# Patient Record
Sex: FEMALE | Race: WHITE | NOT HISPANIC OR LATINO | Employment: OTHER | URBAN - NONMETROPOLITAN AREA
[De-identification: names, ages, dates, MRNs, and addresses within clinical notes are randomized per-mention and may not be internally consistent; named-entity substitution may affect disease eponyms.]

---

## 2021-08-30 ENCOUNTER — OFFICE VISIT (OUTPATIENT)
Dept: PULMONOLOGY | Facility: CLINIC | Age: 66
End: 2021-08-30

## 2021-08-30 VITALS
HEIGHT: 64 IN | RESPIRATION RATE: 16 BRPM | TEMPERATURE: 97.3 F | OXYGEN SATURATION: 100 % | DIASTOLIC BLOOD PRESSURE: 51 MMHG | WEIGHT: 86.9 LBS | BODY MASS INDEX: 14.84 KG/M2 | HEART RATE: 84 BPM | SYSTOLIC BLOOD PRESSURE: 116 MMHG

## 2021-08-30 DIAGNOSIS — J43.2 CENTRILOBULAR EMPHYSEMA (HCC): Primary | ICD-10-CM

## 2021-08-30 DIAGNOSIS — Z72.0 TOBACCO ABUSE: ICD-10-CM

## 2021-08-30 DIAGNOSIS — E44.0 MODERATE MALNUTRITION (HCC): ICD-10-CM

## 2021-08-30 PROCEDURE — 99204 OFFICE O/P NEW MOD 45 MIN: CPT | Performed by: INTERNAL MEDICINE

## 2021-08-30 PROCEDURE — 99406 BEHAV CHNG SMOKING 3-10 MIN: CPT | Performed by: INTERNAL MEDICINE

## 2021-08-30 RX ORDER — ATORVASTATIN CALCIUM 20 MG/1
20 TABLET, FILM COATED ORAL DAILY
COMMUNITY

## 2021-08-30 RX ORDER — OMEPRAZOLE 20 MG/1
20 CAPSULE, DELAYED RELEASE ORAL DAILY
COMMUNITY

## 2021-08-30 RX ORDER — HYDROCORTISONE AND ACETIC ACID 20.75; 10.375 MG/ML; MG/ML
SOLUTION AURICULAR (OTIC) 2 TIMES DAILY
COMMUNITY

## 2021-08-30 RX ORDER — TRIAMTERENE AND HYDROCHLOROTHIAZIDE 37.5; 25 MG/1; MG/1
1 TABLET ORAL DAILY
COMMUNITY
End: 2023-03-01

## 2021-08-30 RX ORDER — DICLOFENAC SODIUM AND MISOPROSTOL 75; 200 MG/1; UG/1
1 TABLET, DELAYED RELEASE ORAL 2 TIMES DAILY
COMMUNITY
End: 2023-02-13 | Stop reason: SDUPTHER

## 2021-08-30 RX ORDER — HYDROXYZINE HYDROCHLORIDE 25 MG/1
25 TABLET, FILM COATED ORAL NIGHTLY
COMMUNITY

## 2021-08-30 RX ORDER — POTASSIUM CHLORIDE 20MEQ/15ML
LIQUID (ML) ORAL DAILY
COMMUNITY
End: 2023-02-13 | Stop reason: SDUPTHER

## 2021-08-30 RX ORDER — ALBUTEROL SULFATE 90 UG/1
2 AEROSOL, METERED RESPIRATORY (INHALATION) EVERY 4 HOURS PRN
COMMUNITY
End: 2023-02-13 | Stop reason: SDUPTHER

## 2021-08-30 RX ORDER — ACETAMINOPHEN AND CODEINE PHOSPHATE 300; 30 MG/1; MG/1
1 TABLET ORAL EVERY 4 HOURS PRN
COMMUNITY

## 2021-08-30 NOTE — PROGRESS NOTES
Primary Care Provider  Olena Milner MD   Referring Provider  No ref. provider found      Patient Complaint  COPD (New Patient )      Subjective          Melodie Whittington presents to [facility] PULMONARY & CRITICAL CARE MEDICINE      History of Presenting Illness  Melodie Whittington is a 65 y.o. female with a history of COPD here to establish care.  She was previously seeing Dr. Barrera of [place] pul[facility] when he was coming to [place].  Since he is no longer coming here she would like to see a pulmonologist closer to home.  Her last PFT was done last year showing severe airflow obstruction.  She also had a chest CT done at [facility] last year showing partially collapsed lung due to mucous plugging.  She denies any history of pneumothorax.  She states that she has about 1 outpatient exacerbation here managed with steroids and antibiotics.  Otherwise, she is doing well.  She takes Breo daily and uses albuterol 1-2 times a day.  Her dyspnea is at baseline.  She gets short of breath walking about 1200 feet or up 2 flights of steps.  It is moderate severity, worse with exertion and relieved with rest.  She denies coughing, wheezing, headaches, chest pain, weight loss or hemoptysis. Denies fevers, chills and night sweats.  She is able to perform ADLs without difficulties and denies and swollen glands/lymph nodes in the head or neck.    Unfortunately, she still continues to smoke.  She smoked 1 pack of cigarettes a day for about 10 years, then quit for 10 years, and is now been smoking for the last 15 years.  She is try nicotine replaced therapy which has not helped.  She has no interest in trying pharmacotherapy.    I have personally reviewed the review of systems, past family, social, medical and surgical histories; and agree with their findings.        Review of Systems  Constitutional symptoms:  Denied complaints   Ear, nose, throat: Denied complaints  Cardiovascular:  Denied  complaints  Respiratory: Dyspnea, otherwise denied complaints  Gastrointestinal: Denied complaints  Musculoskeletal: Denied complaints  Genitourinary: Denied complaints  Allergy / Immunology: Denied complaints  Hematologic: Denied complaints  Neurologic: Denied complaints  Skin: Denied complaints  Endocrine: Denied complaints  Psychiatric: Denied complaints      Family History   Problem Relation Age of Onset   • Cancer Mother    • Cancer Brother    • COPD Brother         Social History     Socioeconomic History   • Marital status:      Spouse name: Not on file   • Number of children: Not on file   • Years of education: Not on file   • Highest education level: Not on file   Tobacco Use   • Smoking status: Current Every Day Smoker     Packs/day: 1.00   • Smokeless tobacco: Never Used   Vaping Use   • Vaping Use: Never used   Substance and Sexual Activity   • Alcohol use: Defer   • Drug use: Defer   • Sexual activity: Defer        History reviewed. No pertinent past medical history.     Immunization History   Administered Date(s) Administered   • COVID-19 (PFIZER) 04/29/2021, 05/20/2021   • Flu Vaccine Intradermal Quad 18-64YR 10/05/2015, 09/28/2016   • Flu Vaccine Quad PF 6-35MO 09/19/2017   • Fluzone High Dose =>65 Years (Vaxcare ONLY) 12/12/2013, 09/18/2014   • Fluzone High-Dose 65+yrs 09/15/2020   • Hepatitis A 05/20/2018, 11/29/2018   • Pneumococcal Conjugate 13-Valent (PCV13) 10/29/2015   • Pneumococcal Polysaccharide (PPSV23) 09/19/2017   • Td 10/13/2004   • Tdap 02/26/2019         No Known Allergies       Current Outpatient Medications:   •  acetaminophen-codeine (TYLENOL #3) 300-30 MG per tablet, Take 1 tablet by mouth Every 4 (Four) Hours As Needed for Moderate Pain ., Disp: , Rfl:   •  acetic acid-hydrocortisone (VOSOL-HC) 1-2 % otic solution, 2 (Two) Times a Day., Disp: , Rfl:   •  albuterol sulfate  (90 Base) MCG/ACT inhaler, Inhale 2 puffs Every 4 (Four) Hours As Needed for Wheezing., Disp:  ", Rfl:   •  atorvastatin (LIPITOR) 20 MG tablet, Take 20 mg by mouth Daily., Disp: , Rfl:   •  diclofenac-miSOPROStol (ARTHROTEC 75) 75-0.2 MG EC tablet, Take 1 tablet by mouth 2 (Two) Times a Day., Disp: , Rfl:   •  fluticasone (VERAMYST) 27.5 MCG/SPRAY nasal spray, 2 sprays into the nostril(s) as directed by provider Daily., Disp: , Rfl:   •  Fluticasone Furoate-Vilanterol (Breo Ellipta) 100-25 MCG/INH inhaler, Inhale 1 puff Daily., Disp: , Rfl:   •  hydrOXYzine (ATARAX) 25 MG tablet, Take 25 mg by mouth 3 (Three) Times a Day As Needed for Itching., Disp: , Rfl:   •  omeprazole (priLOSEC) 20 MG capsule, Take 20 mg by mouth Daily., Disp: , Rfl:   •  potassium chloride (KAYCIEL) 20 MEQ/15ML (10%) solution, Take  by mouth Daily., Disp: , Rfl:   •  triamterene-hydrochlorothiazide (MAXZIDE-25) 37.5-25 MG per tablet, Take 1 tablet by mouth Daily., Disp: , Rfl:          Objective     Vital Signs:   /51 (BP Location: Left arm, Patient Position: Sitting, Cuff Size: Small Adult)   Pulse 84   Temp 97.3 °F (36.3 °C) (Tympanic)   Resp 16   Ht 162.6 cm (64\")   Wt 39.4 kg (86 lb 14.4 oz)   SpO2 100% Comment: room air  BMI 14.92 kg/m²     Physical Exam  Vital Signs Reviewed   Thin female, Alert, NAD.    HEENT:  PERRL, EOMI.  OP, nares clear, no sinus tenderness  Neck:  Supple, no JVD, no thyromegaly  Lymph: no axillary, cervical, supraclavicular lymphadenopathy noted bilaterally  Chest:   Barrel chested, poor aeration, clear to auscultation bilaterally, tympanic to percussion bilaterally, no work of breathing noted  CV: RRR, no MGR, pulses 2+, equal.  Abd:  Soft, NT, ND, + BS, no HSM  EXT:  no clubbing, no cyanosis, no edema, no joint tenderness, muscle wasting noted all 4 extremities  Neuro:  A&Ox3, CN grossly intact, no focal deficits.  Skin: No rashes or lesions noted       Result Review :   I have personally reviewed office notes from her referring provider.  Also personally reviewed pulmonary function test " showing severe airflow obstruction.  Also personally reviewed office notes from her previous pulmonologist.  Imaging from [facility] personally reviewed showing severe emphysema with some areas of mucous plugging.  Labs showed no peripheral eosinophilia and no evidence of chronic hypercapnic respiratory failure.         Assessment and Plan      Patient Active Problem List   Diagnosis   • Centrilobular emphysema (CMS/HCC)   • Tobacco abuse       Impression:  Chronic dyspnea at baseline  COPD.  Gold stage C.  Well-controlled with LABA/ICS therapy with albuterol as needed.  COPD assessment test score 6 signifying great disease control  Ongoing tobacco abuse with cigarettes.  Not eligible for lung cancer screening given she has smoked less than 30 pack years  History of mucous plugging/atelectasis  Moderate protein calorie malnutrition with muscle wasting.  BMI 14.9    Plan:  Obtain records from Dr. Barrera with local pulmonology.  We will do alpha-1 testing next visit if this was not done by her previous pulmonologist.  Will need last office note, PFT and 6-minute walk test  Obtain chest CT and chest x-ray reports from [facility]  Continue Breo with albuterol as needed  Encourage activity  Diet and exercise counseling provided today.  Recommended 3000 -calorie/day diet as well as 15 to 30 minutes of daily exercise.  Patient verbalized understanding regarding this.  Offered dietary referral but the patient declined.  Total time of counseling today was 4 minutes  Melodie Whittington  reports that she has been smoking. She has been smoking about 1.00 pack per day. She has never used smokeless tobacco.. I have educated her on the risk of diseases from using tobacco products such as cancer, COPD and heart disease. I advised her to quit and she is willing to quit. We have discussed the following method/s for tobacco cessation:  Education Material [place] OTC Cessation Products.  Together we have set a quit date for 2  weeks from today.  She will follow up with me in several months or sooner to check on her progress. I spent 4 minutes counseling the patient.  Vaccination status: Up-to-date with flu, Prevnar, Pneumovax, COVID-19 vaccination.  To get flu shot this fall  Medications personally reviewed.      Follow Up   Return in about 1 year (around 8/30/2022) for Next scheduled follow up.  Patient was given instructions and counseling regarding her condition or for health maintenance advice. Please see specific information pulled into the AVS if appropriate.      Electronically signed by Sam Haddad MD, 08/30/21, 9:43 AM EDT.

## 2022-03-30 ENCOUNTER — TELEPHONE (OUTPATIENT)
Dept: PULMONOLOGY | Facility: CLINIC | Age: 67
End: 2022-03-30

## 2022-04-04 NOTE — TELEPHONE ENCOUNTER
Called and left message for Shirley letting her know I have not gotten a surgical clearance letter to fill out on this pt.

## 2023-02-13 ENCOUNTER — OFFICE VISIT (OUTPATIENT)
Dept: PULMONOLOGY | Facility: CLINIC | Age: 68
End: 2023-02-13
Payer: MEDICARE

## 2023-02-13 VITALS
BODY MASS INDEX: 14.33 KG/M2 | DIASTOLIC BLOOD PRESSURE: 83 MMHG | SYSTOLIC BLOOD PRESSURE: 132 MMHG | WEIGHT: 83.5 LBS | OXYGEN SATURATION: 100 % | HEART RATE: 82 BPM

## 2023-02-13 DIAGNOSIS — J43.2 CENTRILOBULAR EMPHYSEMA: Primary | ICD-10-CM

## 2023-02-13 DIAGNOSIS — Z71.6 TOBACCO ABUSE COUNSELING: ICD-10-CM

## 2023-02-13 DIAGNOSIS — Z23 ENCOUNTER FOR IMMUNIZATION: ICD-10-CM

## 2023-02-13 DIAGNOSIS — R06.09 DOE (DYSPNEA ON EXERTION): ICD-10-CM

## 2023-02-13 DIAGNOSIS — E43 SEVERE MALNUTRITION: ICD-10-CM

## 2023-02-13 DIAGNOSIS — Z72.0 TOBACCO ABUSE: ICD-10-CM

## 2023-02-13 DIAGNOSIS — F17.210 NICOTINE DEPENDENCE, CIGARETTES, UNCOMPLICATED: ICD-10-CM

## 2023-02-13 PROCEDURE — 99214 OFFICE O/P EST MOD 30 MIN: CPT | Performed by: INTERNAL MEDICINE

## 2023-02-13 PROCEDURE — 90677 PCV20 VACCINE IM: CPT | Performed by: INTERNAL MEDICINE

## 2023-02-13 PROCEDURE — G0296 VISIT TO DETERM LDCT ELIG: HCPCS | Performed by: INTERNAL MEDICINE

## 2023-02-13 PROCEDURE — G0009 ADMIN PNEUMOCOCCAL VACCINE: HCPCS | Performed by: INTERNAL MEDICINE

## 2023-02-13 PROCEDURE — 99406 BEHAV CHNG SMOKING 3-10 MIN: CPT | Performed by: INTERNAL MEDICINE

## 2023-02-13 RX ORDER — OMEPRAZOLE 20 MG/1
20 CAPSULE, DELAYED RELEASE ORAL
COMMUNITY
End: 2023-02-13 | Stop reason: SDUPTHER

## 2023-02-13 RX ORDER — FLUTICASONE FUROATE AND VILANTEROL 100; 25 UG/1; UG/1
1 POWDER RESPIRATORY (INHALATION)
Qty: 1 EACH | Refills: 11 | Status: SHIPPED | OUTPATIENT
Start: 2023-02-13 | End: 2023-02-13 | Stop reason: SDUPTHER

## 2023-02-13 RX ORDER — ACETIC ACID 20.65 MG/ML
4 SOLUTION AURICULAR (OTIC)
COMMUNITY
Start: 2022-11-08 | End: 2023-02-13 | Stop reason: SDUPTHER

## 2023-02-13 RX ORDER — DICLOFENAC SODIUM 75 MG/1
75 TABLET, DELAYED RELEASE ORAL DAILY
COMMUNITY

## 2023-02-13 RX ORDER — POTASSIUM CHLORIDE 20 MEQ/1
20 TABLET, EXTENDED RELEASE ORAL DAILY
COMMUNITY

## 2023-02-13 RX ORDER — ALBUTEROL SULFATE 90 UG/1
2 AEROSOL, METERED RESPIRATORY (INHALATION) EVERY 4 HOURS PRN
Qty: 18 G | Refills: 11 | Status: SHIPPED | OUTPATIENT
Start: 2023-02-13 | End: 2023-02-28 | Stop reason: SDUPTHER

## 2023-02-13 RX ORDER — FLUTICASONE FUROATE AND VILANTEROL 100; 25 UG/1; UG/1
1 POWDER RESPIRATORY (INHALATION)
Qty: 1 EACH | Refills: 11 | Status: SHIPPED | OUTPATIENT
Start: 2023-02-13 | End: 2023-03-03 | Stop reason: ALTCHOICE

## 2023-02-13 RX ORDER — FLUTICASONE PROPIONATE 50 MCG
1 SPRAY, SUSPENSION (ML) NASAL DAILY
COMMUNITY

## 2023-02-13 NOTE — PROGRESS NOTES
Primary Care Provider  Olena Milner MD   Referring Provider  No ref. provider found      Patient Complaint  Emphysema and Follow-up (1 Year )      Subjective          Melodie Whittington presents to [facility] PULMONARY & CRITICAL CARE MEDICINE      History of Presenting Illness  Melodie Whittington is a 67 y.o. female with a history of COPD here for follow-up.  Since last visit we never received any records from Dr. Jesus.  She is due for PFTs and alpha-1 testing and needs to be enrolled in lung cancer screening.  She previously had a chest CT showing partially collapsed lung due to mucous plugging.  Denies any pneumothorax.  She has not had an exacerbation since we last saw her about 18 months ago.  She is on Breo 100 and does not need albuterol that often.  She gets short of breath walking about 100 feet.  This is migratory, worse with activity relieved with rest patient is a chronic cough productive with thick sputum that clears easily.  She denies any wheezing.  She still smoking 1 pack of cigarettes a day and declines any pharmacotherapy or nicotine replacement therapy but is trying to cut back. She  wheezing, headaches, chest pain, weight loss or hemoptysis. Denies fevers, chills and night sweats.  She is able to perform ADLs without difficulties and denies and swollen glands/lymph nodes in the head or neck.    I have personally reviewed the review of systems, past family, social, medical and surgical histories; and agree with their findings.    Review of Systems  Constitutional symptoms:  Denied complaints   Cardiovascular:  Denied complaints  Respiratory: Dyspnea, cough, otherwise denied complaints  Gastrointestinal: Denied complaints        Family History   Problem Relation Age of Onset   • Cancer Mother    • Cancer Brother    • COPD Brother         Social History     Socioeconomic History   • Marital status:    Tobacco Use   • Smoking status: Every Day     Packs/day: 1.00     Types:  Cigarettes     Start date: 1973   • Smokeless tobacco: Never   Vaping Use   • Vaping Use: Never used   Substance and Sexual Activity   • Alcohol use: Defer   • Drug use: Defer   • Sexual activity: Defer        History reviewed. No pertinent past medical history.     Immunization History   Administered Date(s) Administered   • COVID-19 (PFIZER) PURPLE CAP 04/29/2021, 05/20/2021, 12/09/2021   • Flu Vaccine Intradermal Quad 18-64YR 10/05/2015, 09/28/2016   • Flu Vaccine Quad PF 6-35MO 09/19/2017   • Fluad Quad 65+ 10/18/2021   • Fluzone High Dose =>65 Years (Vaxcare ONLY) 12/12/2013, 09/18/2014   • Fluzone High-Dose 65+yrs 12/12/2013, 09/18/2014, 09/15/2020, 10/01/2021, 09/05/2022   • Hep A, 2 Dose 05/21/2018, 11/29/2018   • Hepatitis A 05/20/2018, 11/29/2018   • Pneumococcal Conjugate 13-Valent (PCV13) 10/29/2015   • Pneumococcal Conjugate 20-Valent (PCV20) 02/13/2023   • Pneumococcal Polysaccharide (PPSV23) 09/19/2017   • Shingrix 09/05/2022, 11/04/2022   • Td 10/13/2004   • Tdap 10/13/2004, 02/26/2019         No Known Allergies       Current Outpatient Medications:   •  acetaminophen-codeine (TYLENOL #3) 300-30 MG per tablet, Take 1 tablet by mouth Every 4 (Four) Hours As Needed for Moderate Pain ., Disp: , Rfl:   •  acetic acid-hydrocortisone (VOSOL-HC) 1-2 % otic solution, 2 (Two) Times a Day., Disp: , Rfl:   •  atorvastatin (LIPITOR) 20 MG tablet, Take 20 mg by mouth Daily., Disp: , Rfl:   •  diclofenac (VOLTAREN) 75 MG EC tablet, Take 75 mg by mouth., Disp: , Rfl:   •  fluticasone (FLONASE) 50 MCG/ACT nasal spray, 1 spray into the nostril(s) as directed by provider Daily., Disp: , Rfl:   •  fluticasone (VERAMYST) 27.5 MCG/SPRAY nasal spray, 2 sprays into the nostril(s) as directed by provider Daily., Disp: , Rfl:   •  hydrOXYzine (ATARAX) 25 MG tablet, Take 25 mg by mouth 3 (Three) Times a Day As Needed for Itching., Disp: , Rfl:   •  omeprazole (priLOSEC) 20 MG capsule, Take 20 mg by mouth Daily., Disp: , Rfl:    •  potassium chloride (K-DUR,KLOR-CON) 20 MEQ CR tablet, Take 20 mEq by mouth Daily., Disp: , Rfl:   •  triamterene-hydrochlorothiazide (MAXZIDE-25) 37.5-25 MG per tablet, Take 1 tablet by mouth Daily., Disp: , Rfl:   •  albuterol sulfate  (90 Base) MCG/ACT inhaler, Inhale 2 puffs Every 4 (Four) Hours As Needed for Wheezing., Disp: 18 g, Rfl: 11  •  Fluticasone Furoate-Vilanterol (Breo Ellipta) 100-25 MCG/ACT aerosol powder , Inhale 1 puff Daily., Disp: 1 each, Rfl: 11         Objective     Vital Signs:   /83 (BP Location: Left arm, Patient Position: Sitting, Cuff Size: Small Adult)   Pulse 82   Wt 37.9 kg (83 lb 8 oz)   SpO2 100% Comment: Room air.  BMI 14.33 kg/m²     Physical Exam  Vital Signs Reviewed   Thin female, Alert, NAD.    HEENT:  PERRL, EOMI.  OP, nares clear  Chest:   Barrel chested, poor aeration, clear to auscultation bilaterally, tympanic to percussion bilaterally, no work of breathing noted  CV: RRR, no MGR, pulses 2+, equal.  Abd:  Soft, NT, ND, + BS, no HSM  EXT:  no clubbing, no cyanosis, no edema,muscle wasting noted all 4 extremities  Neuro:  A&Ox3, CN grossly intact, no focal deficits.  Skin: No rashes or lesions noted       Result Review :   I have personally reviewed my last office note.  Also reviewed office notes from referring provider.  Personally reviewed labs in 2023 showing no peripheral eosinophilia and no evidence of chronic hypercapnia.       Assessment and Plan      Patient Active Problem List   Diagnosis   • Centrilobular emphysema (HCC)   • Tobacco abuse       Impression:  Chronic dyspnea at baseline  COPD.  Gold stage C.  Well-controlled with LABA/ICS therapy with albuterol as needed.  COPD assessment test score 5 signifying great disease control  Ongoing tobacco abuse with cigarettes.  Is eligible for lung cancer screening  History of mucous plugging/atelectasis  Severe protein calorie malnutrition with muscle wasting.  BMI 14.3    Plan:  Check full  pulmonary function test and 6-minute walk test to assess for airflow obstruction, bronchodilator response and exertional hypoxemia  Check alpha-1 antitrypsin level and genotype  Shared decision making regarding lung cancer screening performed in the office today.  Please see risk versus benefits part of this note for further details.   Continue Breo 100 with albuterol as needed.  Offered transitioning patient to a LAMA/LABA but she is declined  Encourage activity  Diet and exercise counseling provided today.  Recommended 3000 -calorie/day diet as well as 15 to 30 minutes of daily exercise.  Patient verbalized understanding regarding this.  Offered dietary referral but the patient declined.  Total time of counseling today was 4 minutes  Melodie Whittington  reports that she has been smoking cigarettes. She started smoking about 50 years ago. She has been smoking an average of 1 pack per day. She has never used smokeless tobacco.. I have educated her on the risk of diseases from using tobacco products such as cancer, COPD and heart disease. I advised her to quit and she is willing to quit. We have discussed the following method/s for tobacco cessation:  Education Material [place] OTC Cessation Products.  Together we have set a quit date for 2 weeks from today.  She will follow up with me in several months or sooner to check on her progress. I spent 4 minutes counseling the patient.  Vaccination status: Up-to-date with flu, Prevnar, Pneumovax, COVID-19 vaccination.  Prevnar 20 today  Medications personally reviewed.    Follow Up   Return in about 6 months (around 8/13/2023).  Patient was given instructions and counseling regarding her condition or for health maintenance advice. Please see specific information pulled into the AVS if appropriate.        Low-Dose Lung Cancer CT Screening Visit    CHIEF COMPLAINT:    Shared Decision Making  I am discussing tobacco cessation today with Melodie DEVYN Pk    SMOKING HISTORY:      Social History     Tobacco Use   Smoking Status Every Day   • Packs/day: 1.00   • Types: Cigarettes   • Start date: 1973   Smokeless Tobacco Never       SUBJECTIVE:     Melodie Whittington is currently smoking 1 pack(s) per day with a  50 pack year history.  Reports no use of alternate forms of tobacco, electronic cigarettes, marijuana or other substances.  Based on the recommendation of the [place] Preventive Services Task Force, this patient is at high risk for lung cancer and a low-dose CT screening scan is recommended.     The patient has had no hemoptysis, unintentional weight loss or increasing shortness of breath. The patient is asymptomatic and has no signs or symptoms of lung cancer.     Together we discussed the potential benefits and potential harms of being screened for lung cancer including the potential for follow up diagnostic testing, risk for over diagnosis, false positive rate and radiation exposure using the [facility] Lung Cancer Screening Shared Decision-Making Tool. A copy of this decision aid resource has been provided in the after visit summary.  We also reviewed the patient's smoking history and counseled her on the importance and health benefits of stopping the use of tobacco products.      Smoking Cessation discussion:     We discussed that there are a number of resources and interventions to assist with smoking cessation if needed in the future.   On a scale of zero to ten, the patient rates their motivation to quit at a 3 out of 10 today.  Referral to stop smoking class has been offered. Medication options for tobacco cessation have been discussed with the patient.     Recommendations for continued lung cancer screening:      We discussed the NCCN guidelines for lung cancer screening and the patient verbalized understanding that annual screening is recommended until fifteen years beyond smoking as long as they have no other disease or comorbidity that would prevent them from  receiving cancer treatments such as surgery should a lung cancer be detected.  After review of the NCCN guidelines and recommendations for ongoing screening, the patient verbalized understanding of recommendations for follow-up.  The patient has decided to proceed with a Low Dose Lung Cancer Screening CT today.      7 minutes face-to-face spent counseling patient on the continued health benefits of having quit tobacco, maintaining smoking abstinence, smoking cessation strategies and resources, including the 1-800-Quit-Now referenced in the AVS, as well as the importance of adherence to annual lung cancer low-dose CT screening.    Electronically signed by Sam Haddad MD, 02/13/23, 10:45 AM EST.

## 2023-02-13 NOTE — TELEPHONE ENCOUNTER
I have sent albuterol and Breo to [facility] and called [facility] and cancelled out the prescription sent there.

## 2023-02-24 ENCOUNTER — HOSPITAL ENCOUNTER (OUTPATIENT)
Dept: CT IMAGING | Facility: HOSPITAL | Age: 68
Discharge: HOME | End: 2023-02-24
Admitting: INTERNAL MEDICINE
Payer: MEDICARE

## 2023-02-24 DIAGNOSIS — Z71.6 TOBACCO ABUSE COUNSELING: ICD-10-CM

## 2023-02-24 DIAGNOSIS — F17.210 NICOTINE DEPENDENCE, CIGARETTES, UNCOMPLICATED: ICD-10-CM

## 2023-02-24 DIAGNOSIS — Z72.0 TOBACCO ABUSE: ICD-10-CM

## 2023-02-24 PROCEDURE — 71271 CT THORAX LUNG CANCER SCR C-: CPT

## 2023-02-27 NOTE — PROGRESS NOTES
Primary Care Provider  Olena Milner MD     Referring Provider  No ref. provider found     Chief Complaint  Pneumonia, Cough, and Follow-up    Subjective          History of Presenting Illness  Patient is a 67-year-old female, patient of Dr. Senior who presents for management of COPD who presents for a follow-up visit today.  Patient states that over the past weekend she was seen at [facility] emergency room and was diagnosed with pneumonia and prescribed Augmentin.  Patient states that she was also on Tamiflu as she was diagnosed with flu a prior to ER visit.  Patient states that she does get short of breath that is worse with exertion, moderate severity, and improved with rest.  Patient states that she does have a cough and at times it is hard to cough up secretions.  Patient had a low-dose chest CT scan completed on 2/24/2023.  Report states minimal right pleural effusion.  Peripheral areas of consolidation throughout both lung fields with areas of bronchiectasis, endobronchial secretion and tree-in-bud infiltrate likely secondary to chronic infectious/inflammatory process.  There is increasing consolidation in the right lower lobe.  Patient had another chest CT scan at [facility] when seen in the emergency room on 2/26/2023 and report states no evidence of pulmonary embolism.  Bilateral lower lobe opacities probably due to bronchopneumonia bronchiolitis.  Underlying chronic lung changes with emphysema.  Tiny right pleural effusion.  Patient states that she is currently taking Augmentin that was prescribed by the emergency room.  Patient states that she has finished Tamiflu.  Patient denies any recent travel.  Patient states that she is retired and used to work in a hospital in housekeeping and may have had exposure to chemicals.  Patient states that she does have a dog in her home.  Patient denies any history of any malignancies, specifically lung cancer with herself.  Patient states  that she had a brother that had throat and lung cancer and her mother had colon cancer.  Patient is a current cigarette smoker and is smoking 1 pack of cigarettes a day and is not ready to set a quit date at this time.  Patient states that she will try to cut back on smoking on her own. Patient denies fever, chills, night sweats, swollen glands in the head and neck, unintentional weight loss, hemoptysis, dysphagia, chest pain, palpitations, chest tightness, abdominal pain, nausea, vomiting, and diarrhea.  Patient also denies any myalgias, changes in sense of taste and/or smell, sore throat, any other coronavirus or flu-like symptoms.  Patient denies any leg swelling, orthopnea, paroxysmal nocturnal dyspnea.  Patient is able to perform activities of daily living.        Review of Systems   Constitutional: Negative for activity change, appetite change, chills, diaphoresis, fatigue, fever, unexpected weight gain and unexpected weight loss.        Negative for Insomnia   HENT: Negative for congestion (Nasal), mouth sores, nosebleeds, postnasal drip, sore throat, swollen glands and trouble swallowing.         Negative for Thrush  Negative for Hoarseness  Negative for Allergies/Hay Fever  Negative for Recent Head injury  Negative for Ear Fullness  Negative for Nasal or Sinus pain  Negative for Dry lips  Negative for Nasal discharge   Respiratory: Positive for cough, shortness of breath and wheezing. Negative for apnea and chest tightness.         Negative for Hemoptysis  Negative for Pleuritic pain   Cardiovascular: Negative for chest pain, palpitations and leg swelling.        Negative for Claudication  Negative for Cyanosis  Negative for Dyspnea on exertion   Gastrointestinal: Negative for abdominal pain, diarrhea, nausea, vomiting and GERD.   Musculoskeletal: Negative for joint swelling and myalgias.        Negative for Joint pain  Negative for Joint stiffness   Skin: Negative for color change, dry skin, pallor and  rash.   Neurological: Negative for syncope, weakness and headache.   Hematological: Negative for adenopathy. Does not bruise/bleed easily.        Family History   Problem Relation Age of Onset   • Cancer Mother    • Cancer Brother    • COPD Brother         Social History     Socioeconomic History   • Marital status:    Tobacco Use   • Smoking status: Every Day     Packs/day: 1.00     Years: 30.00     Pack years: 30.00     Types: Cigarettes     Start date: 1973   • Smokeless tobacco: Never   Vaping Use   • Vaping Use: Never used   Substance and Sexual Activity   • Alcohol use: Defer   • Drug use: Defer   • Sexual activity: Defer        History reviewed. No pertinent past medical history.     Immunization History   Administered Date(s) Administered   • COVID-19 (PFIZER) PURPLE CAP 04/29/2021, 05/20/2021, 12/09/2021   • Flu Vaccine Intradermal Quad 18-64YR 10/05/2015, 09/28/2016   • Flu Vaccine Quad PF 6-35MO 09/19/2017   • Fluad Quad 65+ 10/18/2021   • Fluzone High Dose =>65 Years ([employer] ONLY) 12/12/2013, 09/18/2014   • Fluzone High-Dose 65+yrs 12/12/2013, 09/18/2014, 09/15/2020, 10/01/2021, 09/05/2022   • Hep A, 2 Dose 05/21/2018, 11/29/2018   • Hepatitis A 05/20/2018, 11/29/2018   • Pneumococcal Conjugate 13-Valent (PCV13) 10/29/2015   • Pneumococcal Conjugate 20-Valent (PCV20) 02/13/2023   • Pneumococcal Polysaccharide (PPSV23) 09/19/2017   • Shingrix 09/05/2022, 11/04/2022   • Td 10/13/2004   • Tdap 10/13/2004, 02/26/2019       No Known Allergies       Current Outpatient Medications:   •  acetaminophen-codeine (TYLENOL #3) 300-30 MG per tablet, Take 1 tablet by mouth Every 4 (Four) Hours As Needed for Moderate Pain., Disp: , Rfl:   •  acetic acid-hydrocortisone (VOSOL-HC) 1-2 % otic solution, 2 (Two) Times a Day., Disp: , Rfl:   •  albuterol (PROVENTIL) (2.5 MG/3ML) 0.083% nebulizer solution, Take 2.5 mg by nebulization Every 4 (Four) Hours As Needed., Disp: , Rfl:   •  albuterol sulfate  (90 Base)  MCG/ACT inhaler, Inhale 2 puffs Every 4 (Four) Hours As Needed., Disp: , Rfl:   •  amoxicillin-clavulanate (AUGMENTIN) 875-125 MG per tablet, Take 875 tablets by mouth 2 (Two) Times a Day. Take 1 tab by mouth twice daily., Disp: , Rfl:   •  atorvastatin (LIPITOR) 20 MG tablet, Take 1 tablet by mouth Daily., Disp: , Rfl:   •  diclofenac (VOLTAREN) 75 MG EC tablet, Take 1 tablet by mouth., Disp: , Rfl:   •  fluticasone (FLONASE) 50 MCG/ACT nasal spray, 1 spray into the nostril(s) as directed by provider Daily., Disp: , Rfl:   •  fluticasone (VERAMYST) 27.5 MCG/SPRAY nasal spray, 2 sprays into the nostril(s) as directed by provider Daily., Disp: , Rfl:   •  Fluticasone Furoate-Vilanterol (Breo Ellipta) 100-25 MCG/ACT aerosol powder , Inhale 1 puff Daily., Disp: 1 each, Rfl: 11  •  hydrOXYzine (ATARAX) 25 MG tablet, Take 1 tablet by mouth 3 (Three) Times a Day As Needed for Itching., Disp: , Rfl:   •  omeprazole (priLOSEC) 20 MG capsule, Take 1 capsule by mouth Daily., Disp: , Rfl:   •  potassium chloride (K-DUR,KLOR-CON) 20 MEQ CR tablet, Take 1 tablet by mouth Daily., Disp: , Rfl:   •  triamterene-hydrochlorothiazide (MAXZIDE-25) 37.5-25 MG per tablet, Take 1 tablet by mouth Daily., Disp: , Rfl:   •  oseltamivir (TAMIFLU) 75 MG capsule, , Disp: , Rfl:   •  sodium chloride 3 % nebulizer solution, Take 4 mL by nebulization 2 (Two) Times a Day for 30 days., Disp: 240 mL, Rfl: 5     Objective     Physical Exam  Vital Signs:   Thin built, Alert, NAD.    HEENT:  PERRL, EOMI.  OP, nares clear, no sinus tenderness  Neck:  Supple, no JVD, no thyromegaly.  Lymph: no axillary, cervical, supraclavicular lymphadenopathy noted bilaterally  Chest:   Diminished breath sounds bilaterally. No wheezes, rales, or rhonchi appreciated.  Normal work of breathing noted.  Patient is able speak full sentences without difficulty.  CV: RRR, no MGR, pulses 2+, equal.  Abd:  Soft, NT, ND, + BS, no HSM  EXT:  no clubbing, no cyanosis, no edema, no  "joint tenderness  Neuro:  A&Ox3, CN grossly intact, no focal deficits.  Skin: No rashes or lesions noted.    /43 (BP Location: Right arm, Patient Position: Sitting, Cuff Size: Small Adult)   Pulse 80   Temp 97.3 °F (36.3 °C) (Tympanic)   Resp 14   Ht 162.6 cm (64.02\")   Wt 39.4 kg (86 lb 12.8 oz)   SpO2 100% Comment: room air  BMI 14.89 kg/m²         Result Review :   I have reviewed Dr. Haddad's last office visit note.  I also reviewed low-dose chest CT report dated from 2/24/2023 and chest CT report dated from 2/26/2023.  See scanned reports.    Procedures:         Assessment and Plan      Assessment:  1. COPD.  Gold stage C.   2. Emphysema.  3. Bronchiectasis low-dose chest CT scan dated from 2/24/2023.  4.  Dyspnea.  5.  Cough.  6.  Pneumonia: Patient reports that she was diagnosed on 2/26/2023 at [facility] ER.  Patient is currently on Augmentin.  7.  Influenza A: Patient treated with Tamiflu.  8.  Seasonal allergies.  9.  Tobacco abuse of cigarettes ongoing.        Plan:  1.  Low-dose chest CT scan showing peripheral areas of consolidation throughout both lung fields with areas of bronchiectasis, endobronchial secretion and tree-in-bud infiltrate likely secondary to chronic infectious/inflammatory process.  There is also increased consolidation in the right lower lobe.  There is a concern for atypical infection.  Will send patient for bronchoscopy with bronchoalveolar lavage, brushings, biopsy.  I have discussed the risks of the procedure with the patient including pneumothorax, hemothorax, bleeding, hypoxia, required mechanical ventilation and death. The patient recognizes these findings, acknowledges these findings and is agreeable to the procedure.  2.  Will order bronchiectasis work-up.  Orders placed today.  3.  Patient is scheduled to have pulmonary function test and a 6-minute walk test on 3/7/2023.  4.  Patient to finish Augmentin as prescribed by the emergency room.  5.  " Continue Breo as prescribed and rinse mouth out after each use.  Patient declines additional inhalers at this time.  Patient would like to wait until she has pulmonary function test completed.  6.  Continue albuterol inhaler and albuterol nebulizer treatment as needed.  7.  For bronchiectasis and airway clearance impairment, will prescribe patient a flutter valve with sodium chloride nebulizer treatments.  8.  Continue Flonase.  9.  Vaccination status: patient reports they are up-to-date with flu, pneumonia, and Covid vaccines.  Patient is advised to continue to follow CDC recommendations such as social distancing wearing a mask and washing hands for at least 20 seconds.  10.  Smoking status: patient is a current cigarette smoker.  I counseled the patient on smoking cessation.  I counseled the patient on the risks of continued smoking including the risk of lung cancer, head and neck cancer, renal cancer, heart disease, stroke, and early death.  Patient refuses nicotine replacement therapy or pharmacotherapy at this time.  Patient is advised to decrease the number of cigarettes they are smoking up until the point to where they can quit.  11.  Patient to call the office, 911, or go to the ER with new or worsening symptoms.  12.  Follow-up for bronchoscopy completed, sooner if needed.          I spent 41 minutes caring for Melodie on this date of service. This time includes time spent by me in the following activities:preparing for the visit, reviewing tests, obtaining and/or reviewing a separately obtained history, performing a medically appropriate examination and/or evaluation , counseling and educating the patient/family/caregiver, ordering medications, tests, or procedures, referring and communicating with other health care professionals , documenting information in the medical record, independently interpreting results and communicating that information with the patient/family/caregiver and care  coordination    Follow Up   Return for follow up after bronchoscopy.  Patient was given instructions and counseling regarding her condition or for health maintenance advice. Please see specific information pulled into the AVS if appropriate.

## 2023-02-28 ENCOUNTER — LAB (OUTPATIENT)
Dept: LAB | Facility: HOSPITAL | Age: 68
End: 2023-02-28
Payer: MEDICARE

## 2023-02-28 ENCOUNTER — OFFICE VISIT (OUTPATIENT)
Dept: PULMONOLOGY | Facility: CLINIC | Age: 68
End: 2023-02-28
Payer: MEDICARE

## 2023-02-28 ENCOUNTER — TELEPHONE (OUTPATIENT)
Dept: PULMONOLOGY | Facility: CLINIC | Age: 68
End: 2023-02-28

## 2023-02-28 VITALS
WEIGHT: 86.8 LBS | OXYGEN SATURATION: 100 % | DIASTOLIC BLOOD PRESSURE: 43 MMHG | TEMPERATURE: 97.3 F | HEART RATE: 80 BPM | BODY MASS INDEX: 14.82 KG/M2 | SYSTOLIC BLOOD PRESSURE: 119 MMHG | HEIGHT: 64 IN | RESPIRATION RATE: 14 BRPM

## 2023-02-28 DIAGNOSIS — J30.2 SEASONAL ALLERGIES: ICD-10-CM

## 2023-02-28 DIAGNOSIS — R06.00 DYSPNEA, UNSPECIFIED TYPE: ICD-10-CM

## 2023-02-28 DIAGNOSIS — R06.09 DOE (DYSPNEA ON EXERTION): ICD-10-CM

## 2023-02-28 DIAGNOSIS — Z11.4 ENCOUNTER FOR SCREENING FOR HUMAN IMMUNODEFICIENCY VIRUS (HIV): ICD-10-CM

## 2023-02-28 DIAGNOSIS — Z72.0 TOBACCO ABUSE: ICD-10-CM

## 2023-02-28 DIAGNOSIS — J44.9 CHRONIC OBSTRUCTIVE PULMONARY DISEASE, UNSPECIFIED COPD TYPE: ICD-10-CM

## 2023-02-28 DIAGNOSIS — E43 SEVERE MALNUTRITION: ICD-10-CM

## 2023-02-28 DIAGNOSIS — R05.9 COUGH, UNSPECIFIED TYPE: ICD-10-CM

## 2023-02-28 DIAGNOSIS — J47.9 BRONCHIECTASIS WITHOUT COMPLICATION: ICD-10-CM

## 2023-02-28 DIAGNOSIS — R06.89 AIRWAY CLEARANCE IMPAIRMENT: ICD-10-CM

## 2023-02-28 DIAGNOSIS — J43.2 CENTRILOBULAR EMPHYSEMA: ICD-10-CM

## 2023-02-28 DIAGNOSIS — J47.9 BRONCHIECTASIS WITHOUT COMPLICATION: Primary | ICD-10-CM

## 2023-02-28 LAB
A FUMIGATUS IGE QN: <0.1 KU/L
ALBUMIN SERPL-MCNC: 4.2 G/DL (ref 3.5–5.2)
ALBUMIN/GLOB SERPL: 1.2 G/DL
ALP SERPL-CCNC: 103 U/L (ref 39–117)
ALPHA1 GLOB MFR UR ELPH: 258 MG/DL (ref 90–200)
ALT SERPL W P-5'-P-CCNC: 8 U/L (ref 1–33)
ANION GAP SERPL CALCULATED.3IONS-SCNC: 11.1 MMOL/L (ref 5–15)
AST SERPL-CCNC: 16 U/L (ref 1–32)
BASOPHILS # BLD AUTO: 0.07 10*3/MM3 (ref 0–0.2)
BASOPHILS NFR BLD AUTO: 1.2 % (ref 0–1.5)
BILIRUB SERPL-MCNC: 0.4 MG/DL (ref 0–1.2)
BUN SERPL-MCNC: 9 MG/DL (ref 8–23)
BUN/CREAT SERPL: 10.3 (ref 7–25)
CALCIUM SPEC-SCNC: 9.1 MG/DL (ref 8.6–10.5)
CHLORIDE SERPL-SCNC: 99 MMOL/L (ref 98–107)
CO2 SERPL-SCNC: 24.9 MMOL/L (ref 22–29)
CREAT SERPL-MCNC: 0.87 MG/DL (ref 0.57–1)
CRP SERPL-MCNC: 5.77 MG/DL (ref 0–0.5)
DEPRECATED RDW RBC AUTO: 39.7 FL (ref 37–54)
DSDNA IGG SERPL IA-ACNC: NEGATIVE [IU]/ML
EGFRCR SERPLBLD CKD-EPI 2021: 72.2 ML/MIN/1.73
EOSINOPHIL # BLD AUTO: 0.13 10*3/MM3 (ref 0–0.4)
EOSINOPHIL NFR BLD AUTO: 2.2 % (ref 0.3–6.2)
ERYTHROCYTE [DISTWIDTH] IN BLOOD BY AUTOMATED COUNT: 12.3 % (ref 12.3–15.4)
ERYTHROCYTE [SEDIMENTATION RATE] IN BLOOD: 46 MM/HR (ref 0–30)
GLOBULIN UR ELPH-MCNC: 3.6 GM/DL
GLUCOSE SERPL-MCNC: 90 MG/DL (ref 65–99)
HCT VFR BLD AUTO: 34 % (ref 34–46.6)
HGB BLD-MCNC: 11.4 G/DL (ref 12–15.9)
HIV1+2 AB SER QL: NORMAL
IGA1 MFR SER: 183 MG/DL (ref 70–400)
IGE SERPL-ACNC: 8.59 KU/L
IGG1 SER-MCNC: 1112 MG/DL (ref 700–1600)
IGM SERPL-MCNC: 55 MG/DL (ref 40–230)
IMM GRANULOCYTES # BLD AUTO: 0.03 10*3/MM3 (ref 0–0.05)
IMM GRANULOCYTES NFR BLD AUTO: 0.5 % (ref 0–0.5)
LYMPHOCYTES # BLD AUTO: 1.14 10*3/MM3 (ref 0.7–3.1)
LYMPHOCYTES NFR BLD AUTO: 18.9 % (ref 19.6–45.3)
MCH RBC QN AUTO: 29.2 PG (ref 26.6–33)
MCHC RBC AUTO-ENTMCNC: 33.5 G/DL (ref 31.5–35.7)
MCV RBC AUTO: 87.2 FL (ref 79–97)
MONOCYTES # BLD AUTO: 0.73 10*3/MM3 (ref 0.1–0.9)
MONOCYTES NFR BLD AUTO: 12.1 % (ref 5–12)
NEUTROPHILS NFR BLD AUTO: 3.93 10*3/MM3 (ref 1.7–7)
NEUTROPHILS NFR BLD AUTO: 65.1 % (ref 42.7–76)
NRBC BLD AUTO-RTO: 0 /100 WBC (ref 0–0.2)
NUCLEAR IGG SER IA-RTO: NEGATIVE
PLATELET # BLD AUTO: 322 10*3/MM3 (ref 140–450)
PMV BLD AUTO: 10.2 FL (ref 6–12)
POTASSIUM SERPL-SCNC: 3.8 MMOL/L (ref 3.5–5.2)
PROT SERPL-MCNC: 7.8 G/DL (ref 6–8.5)
RBC # BLD AUTO: 3.9 10*6/MM3 (ref 3.77–5.28)
SODIUM SERPL-SCNC: 135 MMOL/L (ref 136–145)
WBC NRBC COR # BLD: 6.03 10*3/MM3 (ref 3.4–10.8)

## 2023-02-28 PROCEDURE — G0432 EIA HIV-1/HIV-2 SCREEN: HCPCS

## 2023-02-28 PROCEDURE — 86235 NUCLEAR ANTIGEN ANTIBODY: CPT

## 2023-02-28 PROCEDURE — 85025 COMPLETE CBC W/AUTO DIFF WBC: CPT

## 2023-02-28 PROCEDURE — 86140 C-REACTIVE PROTEIN: CPT

## 2023-02-28 PROCEDURE — 36415 COLL VENOUS BLD VENIPUNCTURE: CPT

## 2023-02-28 PROCEDURE — 86225 DNA ANTIBODY NATIVE: CPT

## 2023-02-28 PROCEDURE — 87449 NOS EACH ORGANISM AG IA: CPT

## 2023-02-28 PROCEDURE — 99215 OFFICE O/P EST HI 40 MIN: CPT | Performed by: NURSE PRACTITIONER

## 2023-02-28 PROCEDURE — 86003 ALLG SPEC IGE CRUDE XTRC EA: CPT

## 2023-02-28 PROCEDURE — 82784 ASSAY IGA/IGD/IGG/IGM EACH: CPT

## 2023-02-28 PROCEDURE — 82103 ALPHA-1-ANTITRYPSIN TOTAL: CPT

## 2023-02-28 PROCEDURE — 86606 ASPERGILLUS ANTIBODY: CPT

## 2023-02-28 PROCEDURE — 82785 ASSAY OF IGE: CPT

## 2023-02-28 PROCEDURE — 86612 BLASTOMYCES ANTIBODY: CPT

## 2023-02-28 PROCEDURE — 85652 RBC SED RATE AUTOMATED: CPT

## 2023-02-28 PROCEDURE — 86038 ANTINUCLEAR ANTIBODIES: CPT

## 2023-02-28 PROCEDURE — 80053 COMPREHEN METABOLIC PANEL: CPT

## 2023-02-28 PROCEDURE — 82787 IGG 1 2 3 OR 4 EACH: CPT

## 2023-02-28 PROCEDURE — 87305 ASPERGILLUS AG IA: CPT

## 2023-02-28 RX ORDER — ALBUTEROL SULFATE 2.5 MG/3ML
2.5 SOLUTION RESPIRATORY (INHALATION) EVERY 4 HOURS PRN
COMMUNITY
End: 2023-03-14 | Stop reason: SDUPTHER

## 2023-02-28 RX ORDER — SODIUM CHLORIDE FOR INHALATION 3 %
4 VIAL, NEBULIZER (ML) INHALATION
Qty: 240 ML | Refills: 5 | Status: SHIPPED | OUTPATIENT
Start: 2023-02-28 | End: 2023-03-14 | Stop reason: SDUPTHER

## 2023-02-28 RX ORDER — ALBUTEROL SULFATE 90 UG/1
2 AEROSOL, METERED RESPIRATORY (INHALATION) EVERY 4 HOURS PRN
COMMUNITY
End: 2023-03-14 | Stop reason: SDUPTHER

## 2023-02-28 RX ORDER — AMOXICILLIN AND CLAVULANATE POTASSIUM 875; 125 MG/1; MG/1
875 TABLET, FILM COATED ORAL 2 TIMES DAILY
COMMUNITY
Start: 2023-02-26 | End: 2023-03-03 | Stop reason: HOSPADM

## 2023-02-28 RX ORDER — OSELTAMIVIR PHOSPHATE 75 MG/1
CAPSULE ORAL
COMMUNITY
Start: 2023-02-18 | End: 2023-03-01

## 2023-03-01 LAB
ENA SS-A AB SER-ACNC: <0.2 AI (ref 0–0.9)
ENA SS-B AB SER-ACNC: <0.2 AI (ref 0–0.9)
IGG SERPL-MCNC: 1148 MG/DL (ref 586–1602)
IGG1 SER-MCNC: 714 MG/DL (ref 248–810)
IGG2 SER-MCNC: 126 MG/DL (ref 130–555)
IGG3 SER-MCNC: 24 MG/DL (ref 15–102)
IGG4 SER-MCNC: 43 MG/DL (ref 2–96)

## 2023-03-02 LAB — 1,3 BETA GLUCAN SER-MCNC: <31 PG/ML

## 2023-03-03 ENCOUNTER — ANESTHESIA (OUTPATIENT)
Dept: GASTROENTEROLOGY | Facility: HOSPITAL | Age: 68
End: 2023-03-03
Payer: MEDICARE

## 2023-03-03 ENCOUNTER — ANESTHESIA EVENT (OUTPATIENT)
Dept: GASTROENTEROLOGY | Facility: HOSPITAL | Age: 68
End: 2023-03-03
Payer: MEDICARE

## 2023-03-03 ENCOUNTER — HOSPITAL ENCOUNTER (OUTPATIENT)
Facility: HOSPITAL | Age: 68
Setting detail: HOSPITAL OUTPATIENT SURGERY
Discharge: HOME | End: 2023-03-03
Attending: INTERNAL MEDICINE | Admitting: INTERNAL MEDICINE
Payer: MEDICARE

## 2023-03-03 VITALS
DIASTOLIC BLOOD PRESSURE: 57 MMHG | OXYGEN SATURATION: 95 % | HEART RATE: 68 BPM | SYSTOLIC BLOOD PRESSURE: 115 MMHG | WEIGHT: 84.22 LBS | BODY MASS INDEX: 14.45 KG/M2 | RESPIRATION RATE: 18 BRPM | TEMPERATURE: 97.6 F

## 2023-03-03 DIAGNOSIS — R06.00 DYSPNEA, UNSPECIFIED TYPE: ICD-10-CM

## 2023-03-03 DIAGNOSIS — Z11.4 ENCOUNTER FOR SCREENING FOR HUMAN IMMUNODEFICIENCY VIRUS (HIV): ICD-10-CM

## 2023-03-03 DIAGNOSIS — R06.89 AIRWAY CLEARANCE IMPAIRMENT: ICD-10-CM

## 2023-03-03 DIAGNOSIS — R05.9 COUGH, UNSPECIFIED TYPE: ICD-10-CM

## 2023-03-03 DIAGNOSIS — J44.9 CHRONIC OBSTRUCTIVE PULMONARY DISEASE, UNSPECIFIED COPD TYPE: ICD-10-CM

## 2023-03-03 DIAGNOSIS — J47.9 BRONCHIECTASIS WITHOUT COMPLICATION: ICD-10-CM

## 2023-03-03 DIAGNOSIS — J43.2 CENTRILOBULAR EMPHYSEMA: ICD-10-CM

## 2023-03-03 DIAGNOSIS — J30.2 SEASONAL ALLERGIES: ICD-10-CM

## 2023-03-03 DIAGNOSIS — J44.9 CHRONIC OBSTRUCTIVE PULMONARY DISEASE, UNSPECIFIED COPD TYPE: Primary | ICD-10-CM

## 2023-03-03 LAB
A FLAVUS AB SER QL ID: NEGATIVE
A FUMIGATUS AB SER QL ID: NEGATIVE
A NIGER AB SER QL ID: NEGATIVE
ACB CMPLX DNA BAL NAA+NON-PRB-NCNCRNG: NOT DETECTED
B DERMAT AB TITR SER: NEGATIVE {TITER}
BLACTX-M ISLT/SPM QL: ABNORMAL
BLAIMP ISLT/SPM QL: ABNORMAL
BLAKPC ISLT/SPM QL: ABNORMAL
BLAOXA-48-LIKE ISLT/SPM QL: ABNORMAL
BLAVIM ISLT/SPM QL: ABNORMAL
C PNEUM DNA NPH QL NAA+NON-PROBE: NOT DETECTED
CILIATED BAL QL: 1 %
E CLOAC COMP DNA BAL NAA+NON-PRB-NCNCRNG: NOT DETECTED
E COLI DNA BAL NAA+NON-PRB-NCNCRNG: NOT DETECTED
FLUAV SUBTYP SPEC NAA+PROBE: DETECTED
FLUBV RNA ISLT QL NAA+PROBE: NOT DETECTED
GP B STREP DNA BAL NAA+NON-PRB-NCNCRNG: NOT DETECTED
HADV DNA SPEC NAA+PROBE: NOT DETECTED
HAEM INFLU DNA BAL NAA+NON-PRB-NCNCRNG: NOT DETECTED
HCOV RNA LOWER RESP QL NAA+NON-PROBE: NOT DETECTED
HMPV RNA NPH QL NAA+NON-PROBE: NOT DETECTED
HPIV RNA LOWER RESP QL NAA+NON-PROBE: NOT DETECTED
K AEROGENES DNA BAL NAA+NON-PRB-NCNCRNG: NOT DETECTED
K OXYTOCA DNA BAL NAA+NON-PRB-NCNCRNG: NOT DETECTED
K PNEU GRP DNA BAL NAA+NON-PRB-NCNCRNG: NOT DETECTED
L PNEUMO DNA LOWER RESP QL NAA+NON-PROBE: NOT DETECTED
LYMPHOCYTES NFR FLD MANUAL: 2 %
M CATARRHALIS DNA BAL NAA+NON-PRB-NCNCRNG: NOT DETECTED
M PNEUMO IGG SER IA-ACNC: NOT DETECTED
MACROPHAGE FLUID: 4 %
MECA+MECC ISLT/SPM QL: ABNORMAL
NDM GENE: ABNORMAL
NEUTROPHILS NFR FLD MANUAL: 93 %
P AERUGINOSA DNA BAL NAA+NON-PRB-NCNCRNG: NOT DETECTED
PROTEUS SP DNA BAL NAA+NON-PRB-NCNCRNG: NOT DETECTED
RHINOVIRUS RNA SPEC NAA+PROBE: NOT DETECTED
RSV RNA NPH QL NAA+NON-PROBE: NOT DETECTED
S AUREUS DNA BAL NAA+NON-PRB-NCNCRNG: NOT DETECTED
S MARCESCENS DNA BAL NAA+NON-PRB-NCNCRNG: NOT DETECTED
S PNEUM DNA BAL NAA+NON-PRB-NCNCRNG: NOT DETECTED
S PYO DNA BAL NAA+NON-PRB-NCNCRNG: NOT DETECTED
VISUAL PRESENCE OF BLOOD: NORMAL

## 2023-03-03 PROCEDURE — 87102 FUNGUS ISOLATION CULTURE: CPT | Performed by: INTERNAL MEDICINE

## 2023-03-03 PROCEDURE — 87070 CULTURE OTHR SPECIMN AEROBIC: CPT | Performed by: INTERNAL MEDICINE

## 2023-03-03 PROCEDURE — 87633 RESP VIRUS 12-25 TARGETS: CPT | Performed by: INTERNAL MEDICINE

## 2023-03-03 PROCEDURE — 87205 SMEAR GRAM STAIN: CPT | Performed by: INTERNAL MEDICINE

## 2023-03-03 PROCEDURE — 31645 BRNCHSC W/THER ASPIR 1ST: CPT | Performed by: INTERNAL MEDICINE

## 2023-03-03 PROCEDURE — 87206 SMEAR FLUORESCENT/ACID STAI: CPT | Performed by: INTERNAL MEDICINE

## 2023-03-03 PROCEDURE — 88108 CYTOPATH CONCENTRATE TECH: CPT | Performed by: INTERNAL MEDICINE

## 2023-03-03 PROCEDURE — 87116 MYCOBACTERIA CULTURE: CPT | Performed by: INTERNAL MEDICINE

## 2023-03-03 PROCEDURE — 25010000002 PROPOFOL 10 MG/ML EMULSION

## 2023-03-03 PROCEDURE — 31624 DX BRONCHOSCOPE/LAVAGE: CPT | Performed by: INTERNAL MEDICINE

## 2023-03-03 PROCEDURE — 89051 BODY FLUID CELL COUNT: CPT | Performed by: INTERNAL MEDICINE

## 2023-03-03 PROCEDURE — 87071 CULTURE AEROBIC QUANT OTHER: CPT | Performed by: INTERNAL MEDICINE

## 2023-03-03 RX ORDER — SODIUM CHLORIDE, SODIUM LACTATE, POTASSIUM CHLORIDE, CALCIUM CHLORIDE 600; 310; 30; 20 MG/100ML; MG/100ML; MG/100ML; MG/100ML
30 INJECTION, SOLUTION INTRAVENOUS CONTINUOUS
Status: DISCONTINUED | OUTPATIENT
Start: 2023-03-03 | End: 2023-03-03 | Stop reason: HOSPADM

## 2023-03-03 RX ORDER — MAGNESIUM HYDROXIDE 1200 MG/15ML
LIQUID ORAL AS NEEDED
Status: DISCONTINUED | OUTPATIENT
Start: 2023-03-03 | End: 2023-03-03 | Stop reason: HOSPADM

## 2023-03-03 RX ORDER — LIDOCAINE HYDROCHLORIDE 40 MG/ML
INJECTION, SOLUTION RETROBULBAR; TOPICAL AS NEEDED
Status: DISCONTINUED | OUTPATIENT
Start: 2023-03-03 | End: 2023-03-03 | Stop reason: HOSPADM

## 2023-03-03 RX ORDER — PROPOFOL 10 MG/ML
VIAL (ML) INTRAVENOUS AS NEEDED
Status: DISCONTINUED | OUTPATIENT
Start: 2023-03-03 | End: 2023-03-03 | Stop reason: SURG

## 2023-03-03 RX ORDER — FLUTICASONE FUROATE AND VILANTEROL TRIFENATATE 100; 25 UG/1; UG/1
1 POWDER RESPIRATORY (INHALATION)
Qty: 1 EACH | Refills: 11 | Status: SHIPPED | OUTPATIENT
Start: 2023-03-03 | End: 2023-03-14 | Stop reason: SDUPTHER

## 2023-03-03 RX ADMIN — PROPOFOL 150 MCG/KG/MIN: 10 INJECTION, EMULSION INTRAVENOUS at 15:14

## 2023-03-03 RX ADMIN — SODIUM CHLORIDE, POTASSIUM CHLORIDE, SODIUM LACTATE AND CALCIUM CHLORIDE 30 ML/HR: 600; 310; 30; 20 INJECTION, SOLUTION INTRAVENOUS at 14:27

## 2023-03-03 RX ADMIN — PROPOFOL 20 MG: 10 INJECTION, EMULSION INTRAVENOUS at 15:17

## 2023-03-03 RX ADMIN — PROPOFOL 40 MG: 10 INJECTION, EMULSION INTRAVENOUS at 15:14

## 2023-03-03 NOTE — ANESTHESIA POSTPROCEDURE EVALUATION
Patient: Melodie Whittington    Procedure Summary     Date: 03/03/23 Room / Location: [facility] ENDOSCOPY 3 / [facility] ENDOSCOPY    Anesthesia Start: 1510 Anesthesia Stop: 1536    Procedure: BRONCHOSCOPY WITH BAL AND WASHINGS (Bilateral: Bronchus) Diagnosis:       Bronchiectasis without complication (HCC)      Cough, unspecified type      Dyspnea, unspecified type      Chronic obstructive pulmonary disease, unspecified COPD type (HCC)      Airway clearance impairment      Seasonal allergies      Encounter for screening for human immunodeficiency virus (HIV)      (Bronchiectasis without complication (HCC) [J47.9])      (Cough, unspecified type [R05.9])      (Dyspnea, unspecified type [R06.00])      (Chronic obstructive pulmonary disease, unspecified COPD type (HCC) [J44.9])      (Airway clearance impairment [R06.89])      (Seasonal allergies [J30.2])      (Encounter for screening for human immunodeficiency virus (HIV) [Z11.4])    Surgeons: Sam Haddad MD Provider: Tyler Rai MD    Anesthesia Type: general, MAC ASA Status: 3          Anesthesia Type: general, MAC    Vitals  Vitals Value Taken Time   /48 03/03/23 1558   Temp 36.6 °C (97.8 °F) 03/03/23 1532   Pulse 70 03/03/23 1559   Resp 18 03/03/23 1553   SpO2 94 % 03/03/23 1559   Vitals shown include unvalidated device data.        Post Anesthesia Care and Evaluation    Patient location during evaluation: bedside  Patient participation: complete - patient participated  Level of consciousness: awake  Pain management: adequate    Airway patency: patent  Anesthetic complications: No anesthetic complications  PONV Status: none  Cardiovascular status: acceptable and stable  Respiratory status: acceptable  Hydration status: acceptable    Comments:

## 2023-03-03 NOTE — INTERVAL H&P NOTE
H&P reviewed. The patient was examined and there are no changes to the H&P.      Electronically signed by Sam Haddad MD, 03/03/23, 1:26 PM EST.

## 2023-03-03 NOTE — OP NOTE
Procedure:  Bronchoscopy with clearance of airways, bronchoalveolar lavage, bronchial washings     Pre-Operative Diagnosis: Mucous plugging     Post-Operative Diagnosis: Same     Timeout performed     Anesthesia: MAC anesthesia     Procedure Details: The patient was consented for the procedure with all risk and benefit of the procedure explained in detail.  She was given the opportunity to ask questions and all concerns were answered. The bronchoscope was inserted into the main airway via the oral cavity. An anatomical survey was done of the main airways and the subsegmental bronchus.      Findings: The vocal cords were normal in appearance and movement with abduction and adduction without difficulty. The trachea was normal in caliber and had no mucosal abnormalities. The left tracheobronchial tree appeared anatomically normal with right lower lobe bronchiectasis. The right tracheobronchial tree appeared anatomically normal with right lower lobe bronchiectasis.  There were copious amounts of thick purulent secretions obstructing left lower and right lower lobe bronchi.  These were cleared and removed.  A bronchoalveolar lavage was performed using 2x60 mL aliquots of normal saline  instilled into the left lower lobe bronchus then aspirated back. There was 34 mL turbid purulent fluid in return.  Bronchial washings were collected.     Findings:  Left and right lower lobe bronchiectasis  Mucous plugging left and right lower lobe bronchi with thick viscous purulent secretions     Estimated Blood Loss: 0mL     Specimens:  Bronchoalveolar lavage of left lower lobe bronchus  Bronchial washings     Complications: None; patient tolerated the procedure well.     Disposition: Stable to discharge home.  Follow-up test results     Patient tolerated the procedure well.    Electronically signed by Sam Haddad MD, 03/03/23, 3:27 PM EST.

## 2023-03-03 NOTE — ANESTHESIA PREPROCEDURE EVALUATION
Anesthesia Evaluation     Patient summary reviewed and Nursing notes reviewed   no history of anesthetic complications:  NPO Solid Status: > 8 hours  NPO Liquid Status: > 2 hours           Airway   Mallampati: II  TM distance: >3 FB  Neck ROM: full  No difficulty expected  Dental    (+) poor dentition        Pulmonary    (+) a smoker Current, COPD, asthma,shortness of breath, sleep apnea, rales (few on right),   Cardiovascular - normal exam  Exercise tolerance: poor (<4 METS)    Rhythm: regular  Rate: normal    (+) hyperlipidemia,       Neuro/Psych- negative ROS  GI/Hepatic/Renal/Endo    (+)  GERD,      Musculoskeletal (-) negative ROS    Abdominal    Substance History - negative use     OB/GYN negative ob/gyn ROS         Other - negative ROS       ROS/Med Hx Other: PAT Nursing Notes unavailable.                   Anesthesia Plan    ASA 3     general and MAC     (Risk of respiratory failure explained. )          CODE STATUS:

## 2023-03-05 LAB
BACTERIA SPEC AEROBE CULT: NORMAL
BACTERIA SPEC RESP CULT: NORMAL
GRAM STN SPEC: NORMAL
GRAM STN SPEC: NORMAL

## 2023-03-05 NOTE — PROGRESS NOTES
Primary Care Provider  Olena Milner MD     Referring Provider  No ref. provider found     Chief Complaint  Cough, Shortness of Breath, Wheezing, COPD, and Follow-up (2 week follow up after bronch.)    Subjective          History of Presenting Illness  Patient is a 67-year-old female, patient of Dr. Haddad's who presents for management of COPD who presents for a follow-up visit today.  Patient reported last office visit that the weekend prior she was seen at [facility] emergency room and was diagnosed with pneumonia and prescribed Augmentin. Patient also reported that she was also on Tamiflu as she was diagnosed with flu a prior to ER visit. Patient had a low-dose chest CT scan completed on 2/24/2023. Report states minimal right pleural effusion.  Peripheral areas of consolidation throughout both lung fields with areas of bronchiectasis, endobronchial secretion and tree-in-bud infiltrate likely secondary to chronic infectious/inflammatory process.  There is increasing consolidation in the right lower lobe.  Patient had another chest CT scan at [facility] when seen in the emergency room on 2/26/2023 and report states no evidence of pulmonary embolism.  Bilateral lower lobe opacities probably due to bronchopneumonia bronchiolitis.  Underlying chronic lung changes with emphysema.  Tiny right pleural effusion. Patient and alpha-1 antitrypsin level and genotype drawn last office visit.  Alpha-1 came back with a normal genotype of M/M with a level of 165.9.  Patient had a bronchoscopy with bronchoalveolar lavage, brushings, biopsy completed on 3/3/2023. Respiratory culture came back showing heavy growth of normal respiratory josh.  Pneumonia panel came back showing influenza A.  Fungal culture came back showing light growth of Candida albicans, thought to be a mouth contaminant.  AFB cultures negative to date.  Cytology came back negative for malignant cells.  Patient was supposed to have a  pulmonary function test and a 6-minute walk test on 3/7/2023, however patient states that she did not show up to the appointment due to not feeling well at that time.  Patient would like both test rescheduled at [facility] as she does not want to travel to [place], [place].  Patient states that since having bronchoscopy completed her breathing has improved.  Patient states that she is taking Breo every day as prescribed and uses an albuterol inhaler and albuterol nebulizer treatments as needed.  Patient also has a flutter valve sodium chloride nebulizer treatments used to assist airway clearance.  Patient states that she is still smoking and is not ready to quit at this time.  Patient had labs completed since last office visit.  IgG subclass 2 came back low.  CRP and sed rate came back elevated.  Patient states that she does have arthritis in her back in both hands and is currently under the care of her primary care provider. Patient denies fever, chills, night sweats, swollen glands in the head and neck, unintentional weight loss, hemoptysis, purulent sputum production, dysphagia, chest pain, palpitations, chest tightness, abdominal pain, nausea, vomiting, and diarrhea.  Patient also denies any myalgias, changes in sense of taste and/or smell, sore throat, any other coronavirus or flu-like symptoms.  Patient denies any leg swelling, orthopnea, paroxysmal nocturnal dyspnea.  Patient is able to perform activities of daily living.        Review of Systems   Constitutional: Negative for activity change, appetite change, chills, diaphoresis, fatigue, fever, unexpected weight gain and unexpected weight loss.        Negative for Insomnia   HENT: Negative for congestion (Nasal), mouth sores, nosebleeds, postnasal drip, sore throat, swollen glands and trouble swallowing.         Negative for Thrush  Negative for Hoarseness  Negative for Allergies/Hay Fever  Negative for Recent Head injury  Negative for  Ear Fullness  Negative for Nasal or Sinus pain  Negative for Dry lips  Negative for Nasal discharge   Respiratory: Positive for cough (improved per patient report), shortness of breath (improved per patient report) and wheezing (improved per patient report). Negative for apnea and chest tightness.         Negative for Hemoptysis  Negative for Pleuritic pain   Cardiovascular: Negative for chest pain, palpitations and leg swelling.        Negative for Claudication  Negative for Cyanosis  Negative for Dyspnea on exertion   Gastrointestinal: Negative for abdominal pain, diarrhea, nausea, vomiting and GERD.   Musculoskeletal: Positive for arthralgias and back pain. Negative for joint swelling and myalgias.        Negative for Joint pain  Negative for Joint stiffness   Skin: Negative for color change, dry skin, pallor and rash.   Neurological: Negative for syncope, weakness and headache.   Hematological: Negative for adenopathy. Does not bruise/bleed easily.        Family History   Problem Relation Age of Onset   • Cancer Mother    • Cancer Brother    • COPD Brother    • Malig Hyperthermia Neg Hx         Social History     Socioeconomic History   • Marital status:    Tobacco Use   • Smoking status: Every Day     Packs/day: 1.00     Years: 30.00     Pack years: 30.00     Types: Cigarettes     Start date: 1973   • Smokeless tobacco: Never   Vaping Use   • Vaping Use: Never used   Substance and Sexual Activity   • Alcohol use: Never   • Drug use: Never   • Sexual activity: Defer        Past Medical History:   Diagnosis Date   • Allergies    • Asthma    • COPD (chronic obstructive pulmonary disease) ([facility])    • GERD (gastroesophageal reflux disease)    • History of pneumonia    • Hyperlipidemia    • Sleep apnea         Immunization History   Administered Date(s) Administered   • COVID-19 (PFIZER) PURPLE CAP 04/29/2021, 05/20/2021, 12/09/2021   • Flu Vaccine Intradermal Quad 18-64YR 10/05/2015, 09/28/2016   • Flu Vaccine  Quad PF 6-35MO 09/19/2017   • Fluad Quad 65+ 10/18/2021   • Fluzone High Dose =>65 Years ([employer] ONLY) 12/12/2013, 09/18/2014   • Fluzone High-Dose 65+yrs 12/12/2013, 09/18/2014, 09/15/2020, 10/01/2021, 09/05/2022   • Hep A, 2 Dose 05/21/2018, 11/29/2018   • Hepatitis A 05/20/2018, 11/29/2018   • Pneumococcal Conjugate 13-Valent (PCV13) 10/29/2015   • Pneumococcal Conjugate 20-Valent (PCV20) 02/13/2023   • Pneumococcal Polysaccharide (PPSV23) 09/19/2017   • Shingrix 09/05/2022, 11/04/2022   • Td 10/13/2004   • Tdap 10/13/2004, 02/26/2019       No Known Allergies       Current Outpatient Medications:   •  acetaminophen-codeine (TYLENOL #3) 300-30 MG per tablet, Take 1 tablet by mouth Every 4 (Four) Hours As Needed for Moderate Pain., Disp: , Rfl:   •  acetic acid-hydrocortisone (VOSOL-HC) 1-2 % otic solution, 2 (Two) Times a Day., Disp: , Rfl:   •  albuterol (PROVENTIL) (2.5 MG/3ML) 0.083% nebulizer solution, Take 2.5 mg by nebulization Every 4 (Four) Hours As Needed for Wheezing or Shortness of Air for up to 90 days., Disp: 360 each, Rfl: 3  •  albuterol sulfate  (90 Base) MCG/ACT inhaler, Inhale 2 puffs Every 4 (Four) Hours As Needed for Wheezing or Shortness of Air for up to 90 days., Disp: 54 g, Rfl: 3  •  atorvastatin (LIPITOR) 20 MG tablet, Take 1 tablet by mouth Daily., Disp: , Rfl:   •  Breo Ellipta 100-25 MCG/ACT aerosol powder , Inhale 1 puff Daily for 90 days. Rinse mouth out after each use, Disp: 3 each, Rfl: 3  •  diclofenac (VOLTAREN) 75 MG EC tablet, Take 1 tablet by mouth Daily., Disp: , Rfl:   •  fluticasone (FLONASE) 50 MCG/ACT nasal spray, 1 spray into the nostril(s) as directed by provider Daily., Disp: , Rfl:   •  hydrOXYzine (ATARAX) 25 MG tablet, Take 1 tablet by mouth Every Night., Disp: , Rfl:   •  omeprazole (priLOSEC) 20 MG capsule, Take 1 capsule by mouth Daily., Disp: , Rfl:   •  potassium chloride (K-DUR,KLOR-CON) 20 MEQ CR tablet, Take 1 tablet by mouth Daily., Disp: , Rfl:   •   "sodium chloride 3 % nebulizer solution, Take 4 mL by nebulization 2 (Two) Times a Day for 90 days., Disp: 720 mL, Rfl: 2     Objective     Physical Exam  Vital Signs:   WDWN, Alert, NAD.    HEENT:  PERRL, EOMI.  OP, nares clear, no sinus tenderness  Neck:  Supple, no JVD, no thyromegaly.  Lymph: no axillary, cervical, supraclavicular lymphadenopathy noted bilaterally  Chest: Mildly decreased breath sounds throughout. No wheezes, rales, or rhonchi appreciated.  Normal work of breathing noted.  Patient is able speak full sentences without difficulty.  CV: RRR, no MGR, pulses 2+, equal.  Abd:  Soft, NT, ND, + BS, no HSM  EXT:  no clubbing, no cyanosis, no edema, no joint tenderness  Neuro:  A&Ox3, CN grossly intact, no focal deficits.  Skin: No rashes or lesions noted.    /48 (BP Location: Left arm, Patient Position: Sitting, Cuff Size: Small Adult)   Pulse 77   Temp 97.5 °F (36.4 °C) (Tympanic)   Resp 14   Ht 162.6 cm (64.02\")   Wt 38.4 kg (84 lb 9.6 oz)   SpO2 96% Comment: room air  BMI 14.51 kg/m²         Result Review :   I have reviewed my last office visit note. I also reviewed lab results dated from 2/28/2023.  I also reviewed bronchoscopy results completed on 3/3/2023. I also reviewed pulmonary function test and a six minute walk test reports completed on 3/7/2023.See reports.      Procedures:         Assessment and Plan      Assessment:  1. COPD.  Gold stage C.  Alpha-1 antitrypsin with a normal genotype of MM with a level of 165.9.  2. Emphysema.  3. Bronchiectasis low-dose chest CT scan dated from 2/24/2023.  4.  Dyspnea.  5.  Cough.  6.  Pneumonia: Patient reports that she was diagnosed on 2/26/2023 at [facility] ER.    7.  Influenza A: Patient treated with Tamiflu.  8.  Seasonal allergies.  9. Mucus plugging.  10. Airway clearance impairment.  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  Patient declines referral to immunology at this time.  12.  Tobacco abuse of cigarettes ongoing.      Plan:  1.   "  Patient had a bronchoscopy completed on 3/3/2023.  Cytology came back negative for malignant cells.  Respiratory culture showing normal respiratory josh.  Pneumonia panel came back showing influenza A in which patient was already treated with Tamiflu.  Fungal culture came back showing light growth of Candida albicans, thought to be a mouth contaminant.  AFB cultures negative to date.  2.    IgG subclass 2 came back low.  Recommend referral to immunology, however patient declines at this time.  3.  Sed rate and CRP were elevated.  Patient states that she does have arthritis and she is under the care of her primary care provider.  Recommend referral to rheumatology, however patient declines at this time.  4.  Patient was not able to have pulmonary function test and 6-minute walk test on 3/7/2023 as scheduled due to not feeling well.  Patient would like tests rescheduled at [facility] she does not want to travel to [place], [place].  New orders for tests to be completed at [facility] placed today.  5.  Continue Breo as prescribed and rinse mouth out after each use.    6.  Continue albuterol inhaler and albuterol nebulizer treatment as needed.  7.  For bronchiectasis and airway clearance impairment, continue flutter valve with sodium chloride nebulizer treatments.  8.  Continue Flonase.  9.  Will repeat chest CT scan in 6 weeks to ensure resolution of pneumonia.  Order placed today.  10.  Vaccination status: patient reports they are up-to-date with flu, pneumonia, and Covid vaccines.  Patient is advised to continue to follow CDC recommendations such as social distancing wearing a mask and washing hands for at least 20 seconds.  11.  Smoking status: patient is a current cigarette smoker.  I counseled the patient on smoking cessation.  I counseled the patient on the risks of continued smoking including the risk of lung cancer, head and neck cancer, renal cancer, heart disease, stroke,  and early death.  Patient refuses nicotine replacement therapy or pharmacotherapy at this time.  Patient is advised to decrease the number of cigarettes they are smoking up until the point to where they can quit.  12.  Patient to call the office, 911, or go to the ER with new or worsening symptoms.  13.  Recommend patient follow-up in 6 to 8 weeks, however patient prefers to follow-up in August 2023 as scheduled.  Patient is advised to follow-up sooner if needed.            Follow Up   Return for keep appt in August per patient request.  Patient was given instructions and counseling regarding her condition or for health maintenance advice. Please see specific information pulled into the AVS if appropriate.

## 2023-03-06 LAB — GALACTOMANNAN AG SPEC IA-ACNC: 0.06 INDEX (ref 0–0.49)

## 2023-03-07 ENCOUNTER — PROCEDURE VISIT (OUTPATIENT)
Dept: CARDIAC REHAB | Facility: HOSPITAL | Age: 68
End: 2023-03-07
Payer: MEDICARE

## 2023-03-07 ENCOUNTER — HOSPITAL ENCOUNTER (OUTPATIENT)
Dept: RESPIRATORY THERAPY | Facility: HOSPITAL | Age: 68
Discharge: HOME | End: 2023-03-07
Payer: MEDICARE

## 2023-03-07 DIAGNOSIS — R06.09 DOE (DYSPNEA ON EXERTION): ICD-10-CM

## 2023-03-07 DIAGNOSIS — Z72.0 TOBACCO ABUSE: ICD-10-CM

## 2023-03-07 DIAGNOSIS — E43 SEVERE MALNUTRITION: ICD-10-CM

## 2023-03-07 DIAGNOSIS — J43.2 CENTRILOBULAR EMPHYSEMA: ICD-10-CM

## 2023-03-07 LAB
CYTO UR: NORMAL
LAB AP CASE REPORT: NORMAL
LAB AP CLINICAL INFORMATION: NORMAL
PATH REPORT.FINAL DX SPEC: NORMAL
PATH REPORT.GROSS SPEC: NORMAL
STAT OF ADQ CVX/VAG CYTO-IMP: NORMAL

## 2023-03-07 PROCEDURE — 94618 PULMONARY STRESS TESTING: CPT

## 2023-03-07 RX ORDER — ALBUTEROL SULFATE 2.5 MG/3ML
2.5 SOLUTION RESPIRATORY (INHALATION) ONCE
Status: DISCONTINUED | OUTPATIENT
Start: 2023-03-07 | End: 2023-03-08 | Stop reason: HOSPADM

## 2023-03-08 ENCOUNTER — TELEPHONE (OUTPATIENT)
Dept: PULMONOLOGY | Facility: CLINIC | Age: 68
End: 2023-03-08
Payer: MEDICARE

## 2023-03-08 NOTE — TELEPHONE ENCOUNTER
Patient came to [facility] asking if we could send order in for a new nebulizer machine for her.  Patient states she was instructed to do so yesterday when she went for PFT and other pulmonary testing at [facility].  Patient states her machine is old, she has had it for 10 years.    Please contact patient to let her know the status of this request. I verified information in chart is correct.

## 2023-03-14 ENCOUNTER — OFFICE VISIT (OUTPATIENT)
Dept: PULMONOLOGY | Facility: CLINIC | Age: 68
End: 2023-03-14
Payer: MEDICARE

## 2023-03-14 VITALS
HEART RATE: 77 BPM | TEMPERATURE: 97.5 F | OXYGEN SATURATION: 96 % | SYSTOLIC BLOOD PRESSURE: 112 MMHG | WEIGHT: 84.6 LBS | DIASTOLIC BLOOD PRESSURE: 48 MMHG | RESPIRATION RATE: 14 BRPM | HEIGHT: 64 IN | BODY MASS INDEX: 14.44 KG/M2

## 2023-03-14 DIAGNOSIS — R76.8 LOW SERUM IGG2 SUBCLASS LEVEL: ICD-10-CM

## 2023-03-14 DIAGNOSIS — J18.9 PNEUMONIA DUE TO INFECTIOUS ORGANISM, UNSPECIFIED LATERALITY, UNSPECIFIED PART OF LUNG: ICD-10-CM

## 2023-03-14 DIAGNOSIS — Z72.0 TOBACCO ABUSE: ICD-10-CM

## 2023-03-14 DIAGNOSIS — J47.9 BRONCHIECTASIS WITHOUT COMPLICATION: ICD-10-CM

## 2023-03-14 DIAGNOSIS — J10.1 INFLUENZA A: ICD-10-CM

## 2023-03-14 DIAGNOSIS — J43.9 PULMONARY EMPHYSEMA, UNSPECIFIED EMPHYSEMA TYPE: ICD-10-CM

## 2023-03-14 DIAGNOSIS — J30.2 SEASONAL ALLERGIES: Primary | ICD-10-CM

## 2023-03-14 DIAGNOSIS — R06.89 AIRWAY CLEARANCE IMPAIRMENT: ICD-10-CM

## 2023-03-14 DIAGNOSIS — R06.00 DYSPNEA, UNSPECIFIED TYPE: ICD-10-CM

## 2023-03-14 DIAGNOSIS — J44.9 CHRONIC OBSTRUCTIVE PULMONARY DISEASE, UNSPECIFIED COPD TYPE: ICD-10-CM

## 2023-03-14 DIAGNOSIS — J43.2 CENTRILOBULAR EMPHYSEMA: ICD-10-CM

## 2023-03-14 DIAGNOSIS — R05.9 COUGH, UNSPECIFIED TYPE: ICD-10-CM

## 2023-03-14 PROCEDURE — 99214 OFFICE O/P EST MOD 30 MIN: CPT | Performed by: NURSE PRACTITIONER

## 2023-03-14 PROCEDURE — 1160F RVW MEDS BY RX/DR IN RCRD: CPT | Performed by: NURSE PRACTITIONER

## 2023-03-14 PROCEDURE — 1159F MED LIST DOCD IN RCRD: CPT | Performed by: NURSE PRACTITIONER

## 2023-03-14 RX ORDER — ALBUTEROL SULFATE 90 UG/1
2 AEROSOL, METERED RESPIRATORY (INHALATION) EVERY 4 HOURS PRN
Qty: 54 G | Refills: 3 | Status: SHIPPED | OUTPATIENT
Start: 2023-03-14 | End: 2023-06-12

## 2023-03-14 RX ORDER — ALBUTEROL SULFATE 2.5 MG/3ML
2.5 SOLUTION RESPIRATORY (INHALATION) EVERY 4 HOURS PRN
Qty: 360 EACH | Refills: 3 | Status: SHIPPED | OUTPATIENT
Start: 2023-03-14 | End: 2023-06-12

## 2023-03-14 RX ORDER — FLUTICASONE FUROATE AND VILANTEROL TRIFENATATE 100; 25 UG/1; UG/1
1 POWDER RESPIRATORY (INHALATION)
Qty: 3 EACH | Refills: 3 | Status: SHIPPED | OUTPATIENT
Start: 2023-03-14 | End: 2023-06-12

## 2023-03-14 RX ORDER — SODIUM CHLORIDE FOR INHALATION 3 %
4 VIAL, NEBULIZER (ML) INHALATION
Qty: 720 ML | Refills: 2 | Status: SHIPPED | OUTPATIENT
Start: 2023-03-14 | End: 2023-06-12

## 2023-03-29 LAB
FUNGUS WND CULT: ABNORMAL
FUNGUS WND CULT: ABNORMAL

## 2023-04-14 ENCOUNTER — HOSPITAL ENCOUNTER (OUTPATIENT)
Dept: CT IMAGING | Facility: HOSPITAL | Age: 68
Discharge: HOME | End: 2023-04-14
Admitting: NURSE PRACTITIONER
Payer: MEDICARE

## 2023-04-14 DIAGNOSIS — R06.00 DYSPNEA, UNSPECIFIED TYPE: ICD-10-CM

## 2023-04-14 DIAGNOSIS — R76.8 LOW SERUM IGG2 SUBCLASS LEVEL: ICD-10-CM

## 2023-04-14 DIAGNOSIS — J47.9 BRONCHIECTASIS WITHOUT COMPLICATION: ICD-10-CM

## 2023-04-14 DIAGNOSIS — R05.9 COUGH, UNSPECIFIED TYPE: ICD-10-CM

## 2023-04-14 DIAGNOSIS — J43.2 CENTRILOBULAR EMPHYSEMA: ICD-10-CM

## 2023-04-14 DIAGNOSIS — J43.9 PULMONARY EMPHYSEMA, UNSPECIFIED EMPHYSEMA TYPE: ICD-10-CM

## 2023-04-14 DIAGNOSIS — Z72.0 TOBACCO ABUSE: ICD-10-CM

## 2023-04-14 DIAGNOSIS — J18.9 PNEUMONIA DUE TO INFECTIOUS ORGANISM, UNSPECIFIED LATERALITY, UNSPECIFIED PART OF LUNG: ICD-10-CM

## 2023-04-14 DIAGNOSIS — J44.9 CHRONIC OBSTRUCTIVE PULMONARY DISEASE, UNSPECIFIED COPD TYPE: ICD-10-CM

## 2023-04-14 DIAGNOSIS — R06.89 AIRWAY CLEARANCE IMPAIRMENT: ICD-10-CM

## 2023-04-14 DIAGNOSIS — J30.2 SEASONAL ALLERGIES: ICD-10-CM

## 2023-04-14 DIAGNOSIS — J10.1 INFLUENZA A: ICD-10-CM

## 2023-04-14 LAB
MYCOBACTERIUM SPEC CULT: NORMAL
MYCOBACTERIUM SPEC CULT: NORMAL
NIGHT BLUE STAIN TISS: NORMAL

## 2023-04-14 PROCEDURE — 71250 CT THORAX DX C-: CPT

## 2023-04-18 DIAGNOSIS — J18.9 PNEUMONIA DUE TO INFECTIOUS ORGANISM, UNSPECIFIED LATERALITY, UNSPECIFIED PART OF LUNG: Primary | ICD-10-CM

## 2023-04-18 DIAGNOSIS — R93.89 ABNORMAL CHEST CT: ICD-10-CM

## 2023-08-14 NOTE — PATIENT INSTRUCTIONS
"Smoking Tobacco Information, Adult  Smoking tobacco can be harmful to your health. Tobacco contains a toxic colorless chemical called nicotine. Nicotine causes changes in your brain that make you want more and more. This is called addiction. This can make it hard to stop smoking once you start. Tobacco also has other toxic chemicals that can hurt your body and raise your risk of many cancers.  Menthol or \"lite\" tobacco or cigarette brands are not safer than regular brands.  How can smoking tobacco affect me?  Smoking tobacco puts you at risk for:  Cancer. Smoking is most commonly associated with lung cancer, but can also lead to cancer in other parts of the body.  Chronic obstructive pulmonary disease (COPD). This is a long-term lung condition that makes it hard to breathe. It also gets worse over time.  High blood pressure (hypertension), heart disease, stroke, heart attack, and lung infections, such as pneumonia.  Cataracts. This is when the lenses in the eyes become clouded.  Digestive problems. This may include peptic ulcers, heartburn, and gastroesophageal reflux disease (GERD).  Oral health problems, such as gum disease, mouth sores, and tooth loss.  Loss of taste and smell.  Smoking also affects how you look and smell. Smoking may cause:  Wrinkles.  Yellow or stained teeth, fingers, and fingernails.  Bad breath.  Bad-smelling clothes and hair.  Smoking tobacco can also affect your social life, because:  It may be challenging to find places to smoke when away from home. Many workplaces, restaurants, hotels, and public places are tobacco-free.  Smoking is expensive. This is due to the cost of tobacco and the long-term costs of treating health problems from smoking.  Secondhand smoke may affect those around you. Secondhand smoke can cause lung cancer, breathing problems, and heart disease. Children of smokers have a higher risk for:  Sudden infant death syndrome (SIDS).  Ear infections.  Lung infections.  What " actions can I take to prevent health problems?  Quit smoking    Do not start smoking. Quit if you already smoke.  Do not replace cigarette smoking with vaping devices, such as e-cigarettes.  Make a plan to quit smoking and commit to it. Look for programs to help you, and ask your health care provider for recommendations and ideas. Set a date and write down all the reasons you want to quit.  Let your friends and family know you are quitting so they can help and support you. Consider finding friends who also want to quit. It can be easier to quit with someone else, so that you can support each other.  Talk with your health care provider about using nicotine replacement medicines to help you quit. These include gum, lozenges, patches, sprays, or pills.  If you try to quit but return to smoking, stay positive. It is common to slip up when you first quit, so take it one day at a time.  Be prepared for cravings. When you feel the urge to smoke, chew gum or suck on hard candy.  Lifestyle  Stay busy.  Take care of your body. Get plenty of exercise, eat a healthy diet, and drink plenty of water.  Find ways to manage your stress, such as meditation, yoga, exercise, or time spent with friends and family.  Ask your health care provider about having regular tests (screenings) to check for cancer. This may include blood tests, imaging tests, and other tests.  Where to find support  To get support to quit smoking, consider:  Asking your health care provider for more information and resources.  Joining a support group for people who want to quit smoking in your local community. There are many effective programs that may help you to quit.  Calling the smokefree.gov counselor helpline at 3-474-QUIT-NOW (1-927.493.1659).  Where to find more information  You may find more information about quitting smoking from:  Centers for Disease Control and Prevention: cdc.gov/tobacco  Smokefree.gov: smokefree.gov  American Lung Association:  [employer]  Contact a health care provider if:  You have problems breathing.  Your lips, nose, or fingers turn blue.  You have chest pain.  You are coughing up blood.  You feel like you will faint.  You have other health changes that cause you to worry.  Summary  Smoking tobacco can negatively affect your health, the health of those around you, your finances, and your social life.  Do not start smoking. Quit if you already smoke. If you need help quitting, ask your health care provider.  Consider joining a support group for people in your local community who want to quit smoking. There are many effective programs that may help you to quit.  This information is not intended to replace advice given to you by your health care provider. Make sure you discuss any questions you have with your health care provider.  Document Revised: 12/13/2022 Document Reviewed: 12/13/2022  [employer] Patient Education c 2023 [employer]  Chronic Obstructive Pulmonary Disease Exacerbation    Chronic obstructive pulmonary disease (COPD) is a long-term (chronic) condition that affects the lungs. COPD is a general term that can be used to describe many different lung problems that cause lung inflammation and limit airflow, including chronic bronchitis and emphysema. COPD exacerbations are episodes when breathing symptoms flare up, become much worse, and require extra treatment.  COPD exacerbations are usually caused by infections. Without treatment, COPD exacerbations can be severe and even life threatening. Frequent COPD exacerbations can cause further damage to the lungs.  What are the causes?  This condition may be caused by:  Respiratory infections, including viral and bacterial infections.  Exposure to smoke.  Exposure to air pollution, chemical fumes, or dust.  Things that can cause an allergic reaction (allergens).  Not taking your usual COPD medicines as directed.  Underlying medical problems, such as congestive heart  failure or infections not involving the lungs.  In many cases, the cause of this condition is not known.  What increases the risk?  The following factors may make you more likely to develop this condition:  Smoking cigarettes.  Being an older adult.  Having frequent prior COPD exacerbations.  What are the signs or symptoms?  Symptoms of this condition include:  Increased coughing.  Increased production of mucus from your lungs.  Increased wheezing and shortness of breath.  Rapid or labored breathing.  Chest tightness.  Less energy than usual.  Sleep disruption from symptoms.  Confusion  Increased sleepiness.  Often, these symptoms happen or get worse even with the use of medicines.  How is this diagnosed?  This condition is diagnosed based on:  Your medical history.  A physical exam.  You may also have tests, including:  A chest X-ray.  Blood tests.  Lung (pulmonary) function tests.  How is this treated?  Treatment for this condition depends on the severity and cause of the symptoms. You may need to be admitted to a hospital for treatment. Some of the treatments commonly used to treat COPD exacerbations are:  Antibiotic medicines. These may be used for severe exacerbations caused by a lung infection, such as pneumonia.  Bronchodilators. These are inhaled medicines that expand the air passages and allow increased airflow. They may make your breathing more comfortable.  Steroid medicines. These act to reduce inflammation in the airways. They may be given with an inhaler, taken by mouth, or given through an IV tube inserted into one of your veins.  Supplemental oxygen therapy.  Airway clearing techniques, such as noninvasive ventilation (NIV) and positive expiratory pressure (PEP). These provide respiratory support through a mask or other noninvasive device. An example of this would be using a continuous positive airway pressure (CPAP) machine to improve delivery of oxygen into your lungs.  Follow these instructions at  home:  Medicines  Take over-the-counter and prescription medicines only as told by your health care provider.  It is important to use correct technique with inhaled medicines.  If you were prescribed an antibiotic medicine or oral steroid, take it as told by your health care provider. Do not stop taking the medicine even if you start to feel better.  Lifestyle  Do not use any products that contain nicotine or tobacco. These products include cigarettes, chewing tobacco, and vaping devices, such as e-cigarettes. If you need help quitting, ask your health care provider.  Eat a healthy diet.  Exercise regularly.  Get enough sleep. Most adults need 7 or more hours per night.  Avoid exposure to all substances that irritate the airway, especially tobacco smoke.  Regularly wash your hands with soap and water for at least 20 seconds. If soap and water are not available, use hand . This may help prevent you from getting infections.  During flu season, avoid enclosed spaces that are crowded with people.  General instructions  Drink enough fluid to keep your urine pale yellow, unless you have a medical condition that requires fluid restriction.  Use a cool mist vaporizer. This humidifies the air and makes it easier for you to clear your chest when you cough.  If you have a home nebulizer and oxygen, continue to use them as told by your health care provider.  Keep all follow-up visits. This is important.  How is this prevented?  Stay up-to-date on pneumococcal and flu (influenza) vaccines. A flu shot is recommended every year to help prevent exacerbations.  Quitting smoking is very important in preventing COPD from getting worse and in preventing exacerbations from happening as often.  Follow all instructions for pulmonary rehabilitation after a recent exacerbation. This can help prevent future exacerbations.  Work with your health care provider to develop and follow an action plan. This tells you what steps to take  when you experience certain symptoms.  Contact a health care provider if:  You have a worsening of your regular COPD symptoms.  Get help right away if:  You have worsening shortness of breath, even when resting.  You have trouble talking.  You have severe chest pain.  You cough up blood.  You have a fever.  You have weakness, vomit repeatedly, or faint.  You feel confused.  You are not able to sleep because of your symptoms.  You have trouble doing daily activities.  These symptoms may represent a serious problem that is an emergency. Do not wait to see if the symptoms will go away. Get medical help right away. Call your local emergency services (911 in the [place] Do not drive yourself to the hospital.  Summary  COPD exacerbations are episodes when breathing symptoms become much worse and require extra treatment above your normal treatment.  Exacerbations can be severe and even life threatening. Frequent COPD exacerbations can cause further damage to your lungs.  COPD exacerbations are usually triggered by infections such as the flu, colds, and even pneumonia.  Treatment for this condition depends on the severity and cause of the symptoms. You may need to be admitted to a hospital for treatment.  Quitting smoking is very important to prevent COPD from getting worse and to prevent exacerbations from happening as often.  This information is not intended to replace advice given to you by your health care provider. Make sure you discuss any questions you have with your health care provider.  Document Revised: 10/26/2021 Document Reviewed: 10/26/2021  [employer] Patient Education c 2023 [employer]

## 2023-08-14 NOTE — PROGRESS NOTES
Primary Care Provider  Olena Milner MD     Referring Provider  No ref. provider found     Chief Complaint  COPD and Follow-up (6 month follow up. )    Subjective          History of Presenting Illness  Patient is a 67-year-old female, patient of Dr. Senior who presents for management of COPD who presents for a follow-up visit today.  Patient states that since last visit she has had 2 ER visits back in July 2023 at [facility] for pneumonia.  Patient states that she was seen on 7/17/2023 at [facility] was prescribed azithromycin and Augmentin.  Patient states that she still was not feeling better and went back again on 7/24/2023 and was prescribed Levaquin.  Patient states that since finishing Levaquin she is feeling much better.  Patient states that her shortness of breath has improved.  Patient currently denies any cough or wheezing.  Patient states that she is taking Breo every day as prescribed and uses albuterol inhaler and albuterol nebulizer treatments as needed.  Patient states that she does use her albuterol inhaler several times a week.  Patient did have a 6-minute walk test completed on 3/7/2023.  Patient's 6-minute walk test did not show any significant desaturations on room air.  Since last office visit patient had a pulmonary function test completed at [facility] 3/27/2023.  Report states normal spirometry.  Patient had a chest CT scan completed on 4/14/2023.  Report states no acute cardiopulmonary abnormality.  Stable subpleural scarring and scarred consolidation in both lungs with regions of bronchiectasis and mild emphysema.  Tree-in-bud nodules appear improved from prior study.  No new or enlarging lung nodules.  Patient had a chest x-ray completed at [facility] on 7/17/2023.  Report states increased markings in the right base suspicious for pneumonia.  Patient then had a chest CT scan completed at [facility] on 7/24/2023.   Report states likely acute on chronic infiltrates, particularly in the right lower lobe.  Follow-up to complete resolution is recommended.  Patient is scheduled to have a follow-up chest CT scan on 10/16/2023 at 9:30 AM.  Patient states that she is scheduled to follow-up with her primary care provider on 8/30/2023.  Patient states that she quit smoking 3 weeks ago and she is currently using nicotine patches.  Patient states that she is taking Prilosec for reflux.  Patient states that she does have a flutter valve sodium chloride nebulizer treatments to use to assist with airway clearance.  Patient states that she is currently being set up with a chest vest.  Patient states that she is taking Flonase for seasonal allergies.  Patient denies fever, chills, night sweats, swollen glands in the head and neck, unintentional weight loss, hemoptysis, purulent sputum production, dysphagia, chest pain, palpitations, chest tightness, abdominal pain, nausea, vomiting, and diarrhea.  Patient also denies any myalgias, changes in sense of taste and/or smell, sore throat, any other coronavirus or flu-like symptoms.  Patient denies any leg swelling, orthopnea, paroxysmal nocturnal dyspnea.  Patient is able to perform activities of daily living.        Review of Systems   Constitutional:  Positive for fatigue. Negative for activity change, appetite change, chills, diaphoresis, fever, unexpected weight gain and unexpected weight loss.        Negative for Insomnia   HENT:  Negative for congestion (Nasal), mouth sores, nosebleeds, postnasal drip, sore throat, swollen glands and trouble swallowing.         Negative for Thrush  Negative for Hoarseness  Negative for Allergies/Hay Fever  Negative for Recent Head injury  Negative for Ear Fullness  Negative for Nasal or Sinus pain  Negative for Dry lips  Negative for Nasal discharge   Respiratory:  Positive for shortness of breath (improved since finishing antibiotics). Negative for apnea,  cough, chest tightness and wheezing.         Negative for Hemoptysis  Negative for Pleuritic pain   Cardiovascular:  Negative for chest pain, palpitations and leg swelling.        Negative for Claudication  Negative for Cyanosis  Negative for Dyspnea on exertion   Gastrointestinal:  Negative for abdominal pain, diarrhea, nausea, vomiting and GERD.   Musculoskeletal:  Negative for joint swelling and myalgias.        Negative for Joint pain  Negative for Joint stiffness   Skin:  Negative for color change, dry skin, pallor and rash.   Neurological:  Negative for syncope, weakness and headache.   Hematological:  Negative for adenopathy. Does not bruise/bleed easily.      Family History   Problem Relation Age of Onset    Cancer Mother     Cancer Brother     COPD Brother     Malig Hyperthermia Neg Hx         Social History     Socioeconomic History    Marital status:    Tobacco Use    Smoking status: Former     Packs/day: 1.00     Years: 30.00     Pack years: 30.00     Types: Cigarettes     Start date:      Quit date: 2023     Years since quittin.0     Passive exposure: Past    Smokeless tobacco: Never   Vaping Use    Vaping Use: Never used   Substance and Sexual Activity    Alcohol use: Never    Drug use: Never    Sexual activity: Defer        Past Medical History:   Diagnosis Date    Allergies     Asthma     COPD (chronic obstructive pulmonary disease)     GERD (gastroesophageal reflux disease)     History of pneumonia     Hyperlipidemia     Sleep apnea         Immunization History   Administered Date(s) Administered    COVID-19 (PFIZER) Purple Cap Monovalent 2021, 2021, 2021    Flu Vaccine Intradermal Quad 18-64YR 10/05/2015, 2016    Flu Vaccine Quad PF 6-35MO 2017    Fluad Quad 65+ 10/18/2021    Fluzone High Dose =>65 Years ([employer] ONLY) 2013, 2014    Fluzone High-Dose 65+yrs 2013, 2014, 09/15/2020, 10/01/2021, 2022    Hep A, 2 Dose  05/21/2018, 11/29/2018    Hepatitis A 05/20/2018, 11/29/2018    Pneumococcal Conjugate 13-Valent (PCV13) 10/29/2015    Pneumococcal Conjugate 20-Valent (PCV20) 02/13/2023    Pneumococcal Polysaccharide (PPSV23) 09/19/2017    Shingrix 09/05/2022, 11/04/2022    Td (TDVAX) 10/13/2004    Tdap 10/13/2004, 02/26/2019       No Known Allergies       Current Outpatient Medications:     acetaminophen-codeine (TYLENOL #3) 300-30 MG per tablet, Take 1 tablet by mouth Every 4 (Four) Hours As Needed for Moderate Pain., Disp: , Rfl:     acetic acid-hydrocortisone (VOSOL-HC) 1-2 % otic solution, 2 (Two) Times a Day., Disp: , Rfl:     albuterol (PROVENTIL) (2.5 MG/3ML) 0.083% nebulizer solution, Take 2.5 mg by nebulization Every 4 (Four) Hours As Needed for Wheezing or Shortness of Air for up to 90 days., Disp: 360 each, Rfl: 3    albuterol sulfate  (90 Base) MCG/ACT inhaler, Inhale 2 puffs Every 4 (Four) Hours As Needed for Wheezing or Shortness of Air for up to 90 days., Disp: 54 g, Rfl: 3    atorvastatin (LIPITOR) 20 MG tablet, Take 1 tablet by mouth Daily., Disp: , Rfl:     Breo Ellipta 100-25 MCG/ACT aerosol powder , Inhale 1 puff Daily for 90 days. Rinse mouth out after each use, Disp: 3 each, Rfl: 3    diclofenac (VOLTAREN) 75 MG EC tablet, Take 1 tablet by mouth Daily., Disp: , Rfl:     fluticasone (FLONASE) 50 MCG/ACT nasal spray, 1 spray into the nostril(s) as directed by provider Daily., Disp: , Rfl:     hydrOXYzine (ATARAX) 25 MG tablet, Take 1 tablet by mouth Every Night., Disp: , Rfl:     omeprazole (priLOSEC) 20 MG capsule, Take 1 capsule by mouth Daily., Disp: , Rfl:     potassium chloride (K-DUR,KLOR-CON) 20 MEQ CR tablet, Take 1 tablet by mouth Daily., Disp: , Rfl:     sodium chloride 3 % nebulizer solution, Take 4 mL by nebulization 2 (Two) Times a Day for 90 days., Disp: 720 mL, Rfl: 3    Umeclidinium Bromide (Incruse Ellipta) 62.5 MCG/ACT aerosol powder , Inhale 1 puff Daily for 30 days., Disp: 30 each,  "Rfl: 5     Objective     Physical Exam  Vital Signs:   WDWN, Alert, NAD.    HEENT:  PERRL, EOMI.  OP, nares clear, no sinus tenderness  Neck:  Supple, no JVD, no thyromegaly.  Lymph: no axillary, cervical, supraclavicular lymphadenopathy noted bilaterally  Chest: Mildly decreased breath sounds throughout. No wheezes, rales, or rhonchi appreciated.  Normal work of breathing noted.  Patient is able speak full sentences without difficulty.  CV: RRR, no MGR, pulses 2+, equal.  Abd:  Soft, NT, ND, + BS, no HSM  EXT:  no clubbing, no cyanosis, no edema, no joint tenderness  Neuro:  A&Ox3, CN grossly intact, no focal deficits.  Skin: No rashes or lesions noted.    /47 (BP Location: Right arm, Patient Position: Sitting, Cuff Size: Small Adult)   Pulse 77   Resp 16   Ht 162.6 cm (64.02\")   Wt 42.5 kg (93 lb 9.6 oz)   SpO2 99% Comment: room air  BMI 16.06 kg/mý         Result Review :   I have reviewed my last office visit note.  I also reviewed 6-minute walk test completed on 3/7/2023.  I also reviewed pulmonary function test report from [facility] scanned into patient's chart completed on 3/27/2023.  I also reviewed chest CT report dated 4/14/2023.  I also reviewed chest x-ray report completed at [facility] on 7/17/2023.  I also reviewed chest CT scan report completed at [facility] on 7/24/2023.  See scanned reports.    Procedures:        Assessment and Plan      Assessment:  1. COPD.  Gold stage C.  Alpha-1 antitrypsin with a normal genotype of MM with a level of 165.9.  2. Emphysema.  3. Bronchiectasis low-dose chest CT scan dated from 2/24/2023.  4.  Dyspnea.  5.  Cough.  6.  History of pneumonia: Patient reports that she was diagnosed on 2/26/2023 and again on 7/17/2023 and 7/24/2023 at [facility] ER.  Patient was treated with azithromycin and Augmentin and then treated with Levaquin.  7.  History of Influenza A: Patient treated with Tamiflu.  8.  " Seasonal allergies.  9.  Mucus plugging.  10. Airway clearance impairment.  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  Patient declines referral to immunology at this time.  12.  GERD.  Patient is on a PPI.  12.  Tobacco abuse of cigarettes.  Patient reports that she quit smoking 3 weeks ago.           Plan:  1.  Continue Breo as prescribed and rinse mouth out after each use.  2.   Patient reports that she is still using her albuterol inhaler several times a week.  Will start patient on Incruse 1 puff once daily.  3.  Continue albuterol inhaler and albuterol nebulizer treatments as needed.  4.  Continue flutter valve with sodium chloride nebulizer treatments to assist with airway clearance.  Patient states that she is in the process of being set up with a chest vest.  5.  Will order a CBC, CMP, and IgE level.  Will repeat IgG and IgG subclasses.  Orders placed today.  6.  Patient is scheduled to have a chest CT scan on 10/16/2023 at 9:30 AM.  7.  Continue Flonase for seasonal allergies.   8.  For GERD,  patient to continue PPI.   Patient is also advised to sleep with the head of the bed elevated and do not eat 3-4 hours prior to bedtime.  9.  Vaccination status: patient reports they are up-to-date with flu, pneumonia, and Covid vaccines.  Patient is advised to continue to follow CDC recommendations such as social distancing wearing a mask and washing hands for at least 20 seconds.  10. Smoking status: Reports that she quit smoking 3 weeks ago and is currently using nicotine patches.  I congratulated the patient on smoking cessation.  I counseled the patient on the risks of continued smoking including the risk of lung cancer, head and neck cancer, renal cancer, heart disease, stroke, and early death.  Patient is advised to refrain from tobacco use and to avoid tobacco exposure.  11.  Patient to call the office, 911, or go to the ER with new or worsening symptoms.  12.  Follow-up in October 2023, sooner if needed.              Follow  Up   Return for October 2023 in [place].  Patient was given instructions and counseling regarding her condition or for health maintenance advice. Please see specific information pulled into the AVS if appropriate.

## 2023-08-28 ENCOUNTER — OFFICE VISIT (OUTPATIENT)
Dept: PULMONOLOGY | Facility: CLINIC | Age: 68
End: 2023-08-28
Payer: MEDICARE

## 2023-08-28 VITALS
WEIGHT: 93.6 LBS | BODY MASS INDEX: 15.98 KG/M2 | SYSTOLIC BLOOD PRESSURE: 130 MMHG | OXYGEN SATURATION: 99 % | HEIGHT: 64 IN | RESPIRATION RATE: 16 BRPM | DIASTOLIC BLOOD PRESSURE: 47 MMHG | HEART RATE: 77 BPM

## 2023-08-28 DIAGNOSIS — R05.9 COUGH, UNSPECIFIED TYPE: ICD-10-CM

## 2023-08-28 DIAGNOSIS — R76.8 LOW SERUM IGG2 SUBCLASS LEVEL: ICD-10-CM

## 2023-08-28 DIAGNOSIS — J30.2 SEASONAL ALLERGIES: Primary | ICD-10-CM

## 2023-08-28 DIAGNOSIS — J43.2 CENTRILOBULAR EMPHYSEMA: ICD-10-CM

## 2023-08-28 DIAGNOSIS — J43.9 PULMONARY EMPHYSEMA, UNSPECIFIED EMPHYSEMA TYPE: ICD-10-CM

## 2023-08-28 DIAGNOSIS — R06.00 DYSPNEA, UNSPECIFIED TYPE: ICD-10-CM

## 2023-08-28 DIAGNOSIS — Z72.0 TOBACCO ABUSE: ICD-10-CM

## 2023-08-28 DIAGNOSIS — J44.9 CHRONIC OBSTRUCTIVE PULMONARY DISEASE, UNSPECIFIED COPD TYPE: ICD-10-CM

## 2023-08-28 DIAGNOSIS — J18.9 PNEUMONIA DUE TO INFECTIOUS ORGANISM, UNSPECIFIED LATERALITY, UNSPECIFIED PART OF LUNG: ICD-10-CM

## 2023-08-28 DIAGNOSIS — J47.9 BRONCHIECTASIS WITHOUT COMPLICATION: ICD-10-CM

## 2023-08-28 DIAGNOSIS — R06.89 AIRWAY CLEARANCE IMPAIRMENT: ICD-10-CM

## 2023-08-28 RX ORDER — SODIUM CHLORIDE FOR INHALATION 3 %
4 VIAL, NEBULIZER (ML) INHALATION AS NEEDED
COMMUNITY
Start: 2023-03-27 | End: 2023-08-28 | Stop reason: SDUPTHER

## 2023-08-28 RX ORDER — UMECLIDINIUM 62.5 UG/1
1 AEROSOL, POWDER ORAL
Qty: 30 EACH | Refills: 5 | Status: SHIPPED | OUTPATIENT
Start: 2023-08-28 | End: 2023-09-27

## 2023-08-28 RX ORDER — ALBUTEROL SULFATE 2.5 MG/3ML
2.5 SOLUTION RESPIRATORY (INHALATION) EVERY 4 HOURS PRN
Qty: 360 EACH | Refills: 3 | Status: SHIPPED | OUTPATIENT
Start: 2023-08-28 | End: 2023-11-26

## 2023-08-28 RX ORDER — SODIUM CHLORIDE FOR INHALATION 3 %
4 VIAL, NEBULIZER (ML) INHALATION 2 TIMES DAILY
Qty: 240 ML | Refills: 5 | Status: SHIPPED | OUTPATIENT
Start: 2023-08-28 | End: 2023-08-28 | Stop reason: SDUPTHER

## 2023-08-28 RX ORDER — ALBUTEROL SULFATE 90 UG/1
2 AEROSOL, METERED RESPIRATORY (INHALATION) EVERY 4 HOURS PRN
Qty: 18 G | Refills: 5 | Status: SHIPPED | OUTPATIENT
Start: 2023-08-28 | End: 2023-08-28 | Stop reason: SDUPTHER

## 2023-08-28 RX ORDER — ALBUTEROL SULFATE 2.5 MG/3ML
2.5 SOLUTION RESPIRATORY (INHALATION) EVERY 4 HOURS PRN
Qty: 180 EACH | Refills: 5 | Status: SHIPPED | OUTPATIENT
Start: 2023-08-28 | End: 2023-08-28 | Stop reason: SDUPTHER

## 2023-08-28 RX ORDER — ALBUTEROL SULFATE 90 UG/1
2 AEROSOL, METERED RESPIRATORY (INHALATION) EVERY 4 HOURS PRN
Qty: 54 G | Refills: 3 | Status: SHIPPED | OUTPATIENT
Start: 2023-08-28 | End: 2023-11-26

## 2023-08-28 RX ORDER — ALBUTEROL SULFATE 90 UG/1
2 AEROSOL, METERED RESPIRATORY (INHALATION) EVERY 4 HOURS PRN
COMMUNITY
Start: 2023-07-21 | End: 2023-08-28 | Stop reason: SDUPTHER

## 2023-08-28 RX ORDER — ALBUTEROL SULFATE 2.5 MG/3ML
2.5 SOLUTION RESPIRATORY (INHALATION) EVERY 4 HOURS PRN
COMMUNITY
Start: 2023-03-16 | End: 2023-08-28 | Stop reason: SDUPTHER

## 2023-08-28 RX ORDER — FLUTICASONE FUROATE AND VILANTEROL TRIFENATATE 100; 25 UG/1; UG/1
1 POWDER RESPIRATORY (INHALATION)
Qty: 3 EACH | Refills: 3 | Status: SHIPPED | OUTPATIENT
Start: 2023-08-28 | End: 2023-11-26

## 2023-08-28 RX ORDER — SODIUM CHLORIDE FOR INHALATION 3 %
4 VIAL, NEBULIZER (ML) INHALATION 2 TIMES DAILY
Qty: 720 ML | Refills: 3 | Status: SHIPPED | OUTPATIENT
Start: 2023-08-28 | End: 2023-11-26

## 2023-10-13 NOTE — PATIENT INSTRUCTIONS
Chronic Obstructive Pulmonary Disease Exacerbation    Chronic obstructive pulmonary disease (COPD) is a long-term (chronic) condition that affects the lungs. COPD is a general term that can be used to describe many different lung problems that cause lung inflammation and limit airflow, including chronic bronchitis and emphysema. COPD exacerbations are episodes when breathing symptoms flare up, become much worse, and require extra treatment.  COPD exacerbations are usually caused by infections. Without treatment, COPD exacerbations can be severe and even life threatening. Frequent COPD exacerbations can cause further damage to the lungs.  What are the causes?  This condition may be caused by:  Respiratory infections, including viral and bacterial infections.  Exposure to smoke.  Exposure to air pollution, chemical fumes, or dust.  Things that can cause an allergic reaction (allergens).  Not taking your usual COPD medicines as directed.  Underlying medical problems, such as congestive heart failure or infections not involving the lungs.  In many cases, the cause of this condition is not known.  What increases the risk?  The following factors may make you more likely to develop this condition:  Smoking cigarettes.  Being an older adult.  Having frequent prior COPD exacerbations.  What are the signs or symptoms?  Symptoms of this condition include:  Increased coughing.  Increased production of mucus from your lungs.  Increased wheezing and shortness of breath.  Rapid or labored breathing.  Chest tightness.  Less energy than usual.  Sleep disruption from symptoms.  Confusion  Increased sleepiness.  Often, these symptoms happen or get worse even with the use of medicines.  How is this diagnosed?  This condition is diagnosed based on:  Your medical history.  A physical exam.  You may also have tests, including:  A chest X-ray.  Blood tests.  Lung (pulmonary) function tests.  How is this treated?  Treatment for this  condition depends on the severity and cause of the symptoms. You may need to be admitted to a hospital for treatment. Some of the treatments commonly used to treat COPD exacerbations are:  Antibiotic medicines. These may be used for severe exacerbations caused by a lung infection, such as pneumonia.  Bronchodilators. These are inhaled medicines that expand the air passages and allow increased airflow. They may make your breathing more comfortable.  Steroid medicines. These act to reduce inflammation in the airways. They may be given with an inhaler, taken by mouth, or given through an IV tube inserted into one of your veins.  Supplemental oxygen therapy.  Airway clearing techniques, such as noninvasive ventilation (NIV) and positive expiratory pressure (PEP). These provide respiratory support through a mask or other noninvasive device. An example of this would be using a continuous positive airway pressure (CPAP) machine to improve delivery of oxygen into your lungs.  Follow these instructions at home:  Medicines  Take over-the-counter and prescription medicines only as told by your health care provider.  It is important to use correct technique with inhaled medicines.  If you were prescribed an antibiotic medicine or oral steroid, take it as told by your health care provider. Do not stop taking the medicine even if you start to feel better.  Lifestyle  Do not use any products that contain nicotine or tobacco. These products include cigarettes, chewing tobacco, and vaping devices, such as e-cigarettes. If you need help quitting, ask your health care provider.  Eat a healthy diet.  Exercise regularly.  Get enough sleep. Most adults need 7 or more hours per night.  Avoid exposure to all substances that irritate the airway, especially tobacco smoke.  Regularly wash your hands with soap and water for at least 20 seconds. If soap and water are not available, use hand . This may help prevent you from getting  infections.  During flu season, avoid enclosed spaces that are crowded with people.  General instructions  Drink enough fluid to keep your urine pale yellow, unless you have a medical condition that requires fluid restriction.  Use a cool mist vaporizer. This humidifies the air and makes it easier for you to clear your chest when you cough.  If you have a home nebulizer and oxygen, continue to use them as told by your health care provider.  Keep all follow-up visits. This is important.  How is this prevented?  Stay up-to-date on pneumococcal and flu (influenza) vaccines. A flu shot is recommended every year to help prevent exacerbations.  Quitting smoking is very important in preventing COPD from getting worse and in preventing exacerbations from happening as often.  Follow all instructions for pulmonary rehabilitation after a recent exacerbation. This can help prevent future exacerbations.  Work with your health care provider to develop and follow an action plan. This tells you what steps to take when you experience certain symptoms.  Contact a health care provider if:  You have a worsening of your regular COPD symptoms.  Get help right away if:  You have worsening shortness of breath, even when resting.  You have trouble talking.  You have severe chest pain.  You cough up blood.  You have a fever.  You have weakness, vomit repeatedly, or faint.  You feel confused.  You are not able to sleep because of your symptoms.  You have trouble doing daily activities.  These symptoms may represent a serious problem that is an emergency. Do not wait to see if the symptoms will go away. Get medical help right away. Call your local emergency services (911 in the [place] Do not drive yourself to the hospital.  Summary  COPD exacerbations are episodes when breathing symptoms become much worse and require extra treatment above your normal treatment.  Exacerbations can be severe and even life threatening. Frequent COPD exacerbations  "can cause further damage to your lungs.  COPD exacerbations are usually triggered by infections such as the flu, colds, and even pneumonia.  Treatment for this condition depends on the severity and cause of the symptoms. You may need to be admitted to a hospital for treatment.  Quitting smoking is very important to prevent COPD from getting worse and to prevent exacerbations from happening as often.  This information is not intended to replace advice given to you by your health care provider. Make sure you discuss any questions you have with your health care provider.  Document Revised: 10/26/2021 Document Reviewed: 10/26/2021  [employer] Patient Education © 2023 [employer]  Smoking Tobacco Information, Adult  Smoking tobacco can be harmful to your health. Tobacco contains a toxic colorless chemical called nicotine. Nicotine causes changes in your brain that make you want more and more. This is called addiction. This can make it hard to stop smoking once you start. Tobacco also has other toxic chemicals that can hurt your body and raise your risk of many cancers.  Menthol or \"lite\" tobacco or cigarette brands are not safer than regular brands.  How can smoking tobacco affect me?  Smoking tobacco puts you at risk for:  Cancer. Smoking is most commonly associated with lung cancer, but can also lead to cancer in other parts of the body.  Chronic obstructive pulmonary disease (COPD). This is a long-term lung condition that makes it hard to breathe. It also gets worse over time.  High blood pressure (hypertension), heart disease, stroke, heart attack, and lung infections, such as pneumonia.  Cataracts. This is when the lenses in the eyes become clouded.  Digestive problems. This may include peptic ulcers, heartburn, and gastroesophageal reflux disease (GERD).  Oral health problems, such as gum disease, mouth sores, and tooth loss.  Loss of taste and smell.  Smoking also affects how you look and smell. Smoking may " cause:  Wrinkles.  Yellow or stained teeth, fingers, and fingernails.  Bad breath.  Bad-smelling clothes and hair.  Smoking tobacco can also affect your social life, because:  It may be challenging to find places to smoke when away from home. Many workplaces, restaurants, hotels, and public places are tobacco-free.  Smoking is expensive. This is due to the cost of tobacco and the long-term costs of treating health problems from smoking.  Secondhand smoke may affect those around you. Secondhand smoke can cause lung cancer, breathing problems, and heart disease. Children of smokers have a higher risk for:  Sudden infant death syndrome (SIDS).  Ear infections.  Lung infections.  What actions can I take to prevent health problems?  Quit smoking    Do not start smoking. Quit if you already smoke.  Do not replace cigarette smoking with vaping devices, such as e-cigarettes.  Make a plan to quit smoking and commit to it. Look for programs to help you, and ask your health care provider for recommendations and ideas. Set a date and write down all the reasons you want to quit.  Let your friends and family know you are quitting so they can help and support you. Consider finding friends who also want to quit. It can be easier to quit with someone else, so that you can support each other.  Talk with your health care provider about using nicotine replacement medicines to help you quit. These include gum, lozenges, patches, sprays, or pills.  If you try to quit but return to smoking, stay positive. It is common to slip up when you first quit, so take it one day at a time.  Be prepared for cravings. When you feel the urge to smoke, chew gum or suck on hard candy.  Lifestyle  Stay busy.  Take care of your body. Get plenty of exercise, eat a healthy diet, and drink plenty of water.  Find ways to manage your stress, such as meditation, yoga, exercise, or time spent with friends and family.  Ask your health care provider about having  regular tests (screenings) to check for cancer. This may include blood tests, imaging tests, and other tests.  Where to find support  To get support to quit smoking, consider:  Asking your health care provider for more information and resources.  Joining a support group for people who want to quit smoking in your local community. There are many effective programs that may help you to quit.  Calling the smokefree.gov counselor helpline at 2-859-QUITNOW (1-597.380.8181).  Where to find more information  You may find more information about quitting smoking from:  Centers for Disease Control and Prevention: cdc.gov/tobacco  Smokefree.gov: smokefree.gov  American Lung Association: freedomfromsmoking.org  Contact a health care provider if:  You have problems breathing.  Your lips, nose, or fingers turn blue.  You have chest pain.  You are coughing up blood.  You feel like you will faint.  You have other health changes that cause you to worry.  Summary  Smoking tobacco can negatively affect your health, the health of those around you, your finances, and your social life.  Do not start smoking. Quit if you already smoke. If you need help quitting, ask your health care provider.  Consider joining a support group for people in your local community who want to quit smoking. There are many effective programs that may help you to quit.  This information is not intended to replace advice given to you by your health care provider. Make sure you discuss any questions you have with your health care provider.  Document Revised: 12/13/2022 Document Reviewed: 12/13/2022  [employer] Patient Education © 2023 [employer]

## 2023-10-13 NOTE — PROGRESS NOTES
Primary Care Provider  Dallin Maxwell APRN     Referring Provider  No ref. provider found     Chief Complaint  Follow-up, Emphysema, and COPD    Subjective          History of Presenting Illness  Patient is a 67-year-old female, patient of Dr. Senior who presents for management of COPD who presents for a follow-up visit today.  Patient states that since last office visit her breathing is at baseline.  Patient states that she does get short of breath that is worse with exertion, mild in severity, and improved with rest.  Patient states that she is taking Breo and Incruse and use albuterol inhaler and albuterol nebulizer treatments as needed.  Patient is on Prilosec for reflux.  Patient states that she does have a flutter valve with sodium chloride nebulizer treatments used to assist with airway clearance.  Patient states that she is taking Flonase for seasonal allergies.  Patient states that she does have obstructive sleep apnea and used to see a sleep specialist, however has not used CPAP machine in several years and does not want a CPAP machine at this time.  Patient denies any morning headaches or excessive daytime sleepiness.  Since last office visit patient had a chest CT scan and labs completed on 10/16/2023. Chest CT report states there stable chronic changes throughout the lungs that likely represent postinflammatory changes or findings related to a chronic infectious process.  There is a new area of bandlike consolidation in the posterior right lower lobe likely representing a focus of active atypical infection, with trace right pleural effusion.  IgE level came back at 11. CBC did not show any peripheral eosinophilia. IgG subclass 2 came back low at 120. Patient denies fever, chills, night sweats, swollen glands in the head and neck, unintentional weight loss, hemoptysis, purulent sputum production, dysphagia, chest pain, palpitations, chest tightness, abdominal pain, nausea, vomiting, and  diarrhea.  Patient also denies any myalgias, changes in sense of taste and/or smell, sore throat, any other coronavirus or flu-like symptoms.  Patient denies any leg swelling, orthopnea, paroxysmal nocturnal dyspnea.  Patient is able to perform activities of daily living.        Review of Systems   Constitutional:  Negative for activity change, appetite change, chills, diaphoresis, fatigue, fever, unexpected weight gain and unexpected weight loss.        Negative for Insomnia   HENT:  Negative for congestion (Nasal), mouth sores, nosebleeds, postnasal drip, sore throat, swollen glands and trouble swallowing.         Negative for Thrush  Negative for Hoarseness  Negative for Allergies/Hay Fever  Negative for Recent Head injury  Negative for Ear Fullness  Negative for Nasal or Sinus pain  Negative for Dry lips  Negative for Nasal discharge   Respiratory:  Positive for cough (intermittent) and shortness of breath (at baseline). Negative for apnea, chest tightness and wheezing.         Negative for Hemoptysis  Negative for Pleuritic pain   Cardiovascular:  Negative for chest pain, palpitations and leg swelling.        Negative for Claudication  Negative for Cyanosis  Negative for Dyspnea on exertion   Gastrointestinal:  Negative for abdominal pain, diarrhea, nausea, vomiting and GERD.   Musculoskeletal:  Negative for joint swelling and myalgias.        Negative for Joint pain  Negative for Joint stiffness   Skin:  Negative for color change, dry skin, pallor and rash.   Neurological:  Negative for syncope, weakness and headache.   Hematological:  Negative for adenopathy. Does not bruise/bleed easily.        Family History   Problem Relation Age of Onset    Cancer Mother     Cancer Brother     COPD Brother     Malig Hyperthermia Neg Hx         Social History     Socioeconomic History    Marital status:    Tobacco Use    Smoking status: Former     Packs/day: 1.00     Years: 30.00     Additional pack years: 0.00      Total pack years: 30.00     Types: Cigarettes     Start date:      Quit date: 2023     Years since quittin.2     Passive exposure: Past    Smokeless tobacco: Never   Vaping Use    Vaping Use: Never used   Substance and Sexual Activity    Alcohol use: Never    Drug use: Never    Sexual activity: Defer        Past Medical History:   Diagnosis Date    Allergies     Asthma     COPD (chronic obstructive pulmonary disease)     GERD (gastroesophageal reflux disease)     History of pneumonia     Hyperlipidemia     Sleep apnea         Immunization History   Administered Date(s) Administered    COVID-19 (PFIZER) Purple Cap Monovalent 2021, 2021, 2021    Flu Vaccine Intradermal Quad 18-64YR 10/05/2015, 2016    Flu Vaccine Quad PF 6-35MO 2017    Fluad Quad 65+ 10/18/2021    Fluzone High Dose =>65 Years ([employer] ONLY) 2013, 2014    Fluzone High-Dose 65+yrs 2013, 2014, 09/15/2020, 10/01/2021, 2022, 2023    Hep A, 2 Dose 2018, 2018    Hepatitis A 2018, 2018    Influenza MDCK Quadrivalent with Preserative 10/15/2018    Pneumococcal Conjugate 13-Valent (PCV13) 10/29/2015    Pneumococcal Conjugate 20-Valent (PCV20) 2023    Pneumococcal Polysaccharide (PPSV23) 2017    Shingrix 2022, 2022    Td (TDVAX) 10/13/2004    Tdap 10/13/2004, 2019       No Known Allergies       Current Outpatient Medications:     acetaminophen-codeine (TYLENOL #3) 300-30 MG per tablet, Take 1 tablet by mouth Every 4 (Four) Hours As Needed for Moderate Pain., Disp: , Rfl:     acetic acid-hydrocortisone (VOSOL-HC) 1-2 % otic solution, 2 (Two) Times a Day., Disp: , Rfl:     albuterol (PROVENTIL) (2.5 MG/3ML) 0.083% nebulizer solution, Take 2.5 mg by nebulization Every 4 (Four) Hours As Needed for Wheezing or Shortness of Air for up to 90 days., Disp: 360 each, Rfl: 3    albuterol sulfate  (90 Base) MCG/ACT inhaler, Inhale 2  "puffs Every 4 (Four) Hours As Needed for Wheezing or Shortness of Air for up to 90 days., Disp: 54 g, Rfl: 3    atorvastatin (LIPITOR) 20 MG tablet, Take 1 tablet by mouth Daily., Disp: , Rfl:     Breo Ellipta 100-25 MCG/ACT aerosol powder , Inhale 1 puff Daily for 90 days. Rinse mouth out after each use, Disp: 3 each, Rfl: 3    diclofenac (VOLTAREN) 75 MG EC tablet, Take 1 tablet by mouth Daily., Disp: , Rfl:     fluticasone (FLONASE) 50 MCG/ACT nasal spray, 1 spray into the nostril(s) as directed by provider Daily., Disp: , Rfl:     hydrOXYzine (ATARAX) 25 MG tablet, Take 1 tablet by mouth Every Night., Disp: , Rfl:     omeprazole (priLOSEC) 20 MG capsule, Take 1 capsule by mouth Daily., Disp: , Rfl:     potassium chloride (K-DUR,KLOR-CON) 20 MEQ CR tablet, Take 1 tablet by mouth Daily., Disp: , Rfl:     sodium chloride 3 % nebulizer solution, Take 4 mL by nebulization 2 (Two) Times a Day for 90 days., Disp: 720 mL, Rfl: 3    Umeclidinium Bromide (Incruse Ellipta) 62.5 MCG/ACT aerosol powder , Inhale 1 puff Daily for 90 days., Disp: 90 each, Rfl: 3     Objective     Physical Exam  Vital Signs:   WDWN, Alert, NAD.    HEENT:  PERRL, EOMI.  OP, nares clear, no sinus tenderness  Neck:  Supple, no JVD, no thyromegaly.  Lymph: no axillary, cervical, supraclavicular lymphadenopathy noted bilaterally  Chest: Mildly decreased breath sounds throughout. No wheezes, rales, or rhonchi appreciated.  Normal work of breathing noted.  Patient is able speak full sentences without difficulty.  CV: RRR, no MGR, pulses 2+, equal.  Abd:  Soft, NT, ND, + BS, no HSM  EXT:  no clubbing, no cyanosis, no edema, no joint tenderness  Neuro:  A&Ox3, CN grossly intact, no focal deficits.  Skin: No rashes or lesions noted.    /58 (BP Location: Left arm, Patient Position: Sitting, Cuff Size: Small Adult)   Pulse 73   Resp 16   Ht 162.6 cm (64.02\")   Wt 44.4 kg (97 lb 12.8 oz)   SpO2 99% Comment: room air  BMI 16.78 kg/m²       "   Result Review :   I have reviewed my last office visit note. I also reviewed labs and chest CT scan reports dated from 10/16/2023. See scan reports.    Procedures:    CMP          2/28/2023    10:49 10/16/2023    09:41   CMP   Glucose 90  87    BUN 9  15    Creatinine 0.87  0.75    EGFR 72.2  86.3    Sodium 135  136    Potassium 3.8  4.3    Chloride 99  100    Calcium 9.1  9.6    Total Protein 7.8  7.4    Albumin 4.2  4.6    Globulin 3.6  2.8    Total Bilirubin 0.4  0.6    Alkaline Phosphatase 103  96    AST (SGOT) 16  22    ALT (SGPT) 8  12    Albumin/Globulin Ratio 1.2  1.6    BUN/Creatinine Ratio 10.3  20.0    Anion Gap 11.1  10.0      CBC          2/28/2023    10:49 10/16/2023    09:41   CBC   WBC 6.03  3.81    RBC 3.90  4.20    Hemoglobin 11.4  12.2    Hematocrit 34.0  38.0    MCV 87.2  90.5    MCH 29.2  29.0    MCHC 33.5  32.1    RDW 12.3  13.5    Platelets 322  232      CBC w/diff          2/28/2023    10:49 10/16/2023    09:41   CBC w/Diff   WBC 6.03  3.81    RBC 3.90  4.20    Hemoglobin 11.4  12.2    Hematocrit 34.0  38.0    MCV 87.2  90.5    MCH 29.2  29.0    MCHC 33.5  32.1    RDW 12.3  13.5    Platelets 322  232    Neutrophil Rel % 65.1  55.0    Immature Granulocyte Rel % 0.5  0.3    Lymphocyte Rel % 18.9  28.3    Monocyte Rel % 12.1  12.3    Eosinophil Rel % 2.2  3.1    Basophil Rel % 1.2  1.0           Assessment and Plan      Assessment:  1. COPD.  Gold stage C.  Alpha-1 antitrypsin with a normal genotype of MM with a level of 165.9.  2. Emphysema.  3. Bronchiectasis low-dose chest CT scan dated from 2/24/2023.  4.  Dyspnea.  5.  Cough.  6.  History of pneumonia: Patient reports that she was diagnosed on 2/26/2023 and again on 7/17/2023 and 7/24/2023 at [facility] ER.  Patient was treated with azithromycin and Augmentin and then treated with Levaquin.  7.  History of Influenza A: Patient treated with Tamiflu.  8.  Seasonal allergies.  9.  Mucus plugging.  10. Airway clearance  impairment.  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  Patient declines referral to immunology at this time.  12.  GERD.  Patient is on a PPI.  13. Abnormal chest CT.  14. Obstructive sleep apnea. Declines CPAP machine.   15.  Abnormal chest CT scan: New area bandlike consolidation in the posterior right lower lobe.  16.  Trace right pleural effusion.  17.  Tobacco abuse of cigarettes. Patient states that she quit smoking ob 8/6/23.          Plan:  1.  Chest CT scan shows stable chronic changes throughout the lungs that likely represent postinflammatory changes or findings related to chronic infectious process.  There is a new area of bandlike consolidation in the posterior right lower lobe and trace right pleural effusion.  Discussed with patient that we could send her for bronchoscopy for further evaluation, however patient declines at this time as she states that she is feeling well.  Patient states that time she does have an intermittent cough at times.  Signs symptoms of fluid accumulation discussed with the patient in the office today.  Patient is advised to the office or go to the ER with any new or worsening symptoms.  Recent CBC and CMP completed on 10/16/2023 came back unremarkable.  2.  Will order a chest x-ray, respiratory culture, and AFB culture.  3.  IgG subclass 2 level is still low.  Recommend referral to immunology, however patient declines at this time.  Will recheck IgG and IgG subclasses prior to next office visit.  4.  Will repeat chest CT scan again in 6 months.  Order placed today.  5.  Continue Breo and Incruse as prescribed and rinse mouth out after each use.  6.  Continue albuterol inhaler and albuterol nebulizer treatments as needed.  7.  Continue flutter valve with sodium chloride nebulizer treatments to assist with airway clearance.  Patient states that she is in the process of being set up with a chest vest.    8.  Continue Flonase for seasonal allergies.   9.  For GERD,  patient to continue PPI.    Patient is also advised to sleep with the head of the bed elevated and do not eat 3-4 hours prior to bedtime.  10.  Patient reports that she was under the care of a sleep specialist and used to be on a CPAP machine however could not tolerate it and turned in several years ago.  Did offer to send patient for another sleep study for evaluation, however patient declines at this time.  Discussed with the patient about the signs and symptoms of sleep apnea. In addition, I have also discussed pathophysiology of sleep apnea.  I also discussed the complications of untreated sleep apnea including effects on hypertension, diabetes mellitus and nonrestorative sleep with hypersomnia which can increase risk for motor vehicle accidents.  Untreated sleep apnea is also a risk factor for development of pulmonary hypertension, atrial fibrillation, and stroke.  Discussed in detail of various testing methods including home-based and lab based sleep studies.  Patient verbalized understanding.  11. Vaccination status: patient reports they are up-to-date with flu, pneumonia, and Covid vaccines.  Patient is advised to continue to follow CDC recommendations such as social distancing wearing a mask and washing hands for at least 20 seconds.  12. Smoking status: Patient is a former cigarette smoker.   13.  Patient to call the office, 911, or go to the ER with new or worsening symptoms.  14.  Follow-up in January 2024, sooner if needed.            Follow Up   Return for January 2024 with Dr. Haddad in [place].  Patient was given instructions and counseling regarding her condition or for health maintenance advice. Please see specific information pulled into the AVS if appropriate.

## 2023-10-16 ENCOUNTER — LAB (OUTPATIENT)
Dept: LAB | Facility: HOSPITAL | Age: 68
End: 2023-10-16
Payer: MEDICARE

## 2023-10-16 ENCOUNTER — HOSPITAL ENCOUNTER (OUTPATIENT)
Dept: CT IMAGING | Facility: HOSPITAL | Age: 68
Discharge: HOME | End: 2023-10-16
Payer: MEDICARE

## 2023-10-16 DIAGNOSIS — J47.9 BRONCHIECTASIS WITHOUT COMPLICATION: ICD-10-CM

## 2023-10-16 DIAGNOSIS — R93.89 ABNORMAL CHEST CT: ICD-10-CM

## 2023-10-16 DIAGNOSIS — J44.9 CHRONIC OBSTRUCTIVE PULMONARY DISEASE, UNSPECIFIED COPD TYPE: ICD-10-CM

## 2023-10-16 DIAGNOSIS — Z72.0 TOBACCO ABUSE: ICD-10-CM

## 2023-10-16 DIAGNOSIS — R06.89 AIRWAY CLEARANCE IMPAIRMENT: ICD-10-CM

## 2023-10-16 DIAGNOSIS — J43.9 PULMONARY EMPHYSEMA, UNSPECIFIED EMPHYSEMA TYPE: ICD-10-CM

## 2023-10-16 DIAGNOSIS — R05.9 COUGH, UNSPECIFIED TYPE: ICD-10-CM

## 2023-10-16 DIAGNOSIS — J18.9 PNEUMONIA DUE TO INFECTIOUS ORGANISM, UNSPECIFIED LATERALITY, UNSPECIFIED PART OF LUNG: ICD-10-CM

## 2023-10-16 DIAGNOSIS — R76.8 LOW SERUM IGG2 SUBCLASS LEVEL: ICD-10-CM

## 2023-10-16 DIAGNOSIS — J30.2 SEASONAL ALLERGIES: ICD-10-CM

## 2023-10-16 DIAGNOSIS — R06.00 DYSPNEA, UNSPECIFIED TYPE: ICD-10-CM

## 2023-10-16 LAB
ALBUMIN SERPL-MCNC: 4.6 G/DL (ref 3.5–5.2)
ALBUMIN/GLOB SERPL: 1.6 G/DL
ALP SERPL-CCNC: 96 U/L (ref 39–117)
ALT SERPL W P-5'-P-CCNC: 12 U/L (ref 1–33)
ANION GAP SERPL CALCULATED.3IONS-SCNC: 10 MMOL/L (ref 5–15)
AST SERPL-CCNC: 22 U/L (ref 1–32)
BASOPHILS # BLD AUTO: 0.04 10*3/MM3 (ref 0–0.2)
BASOPHILS NFR BLD AUTO: 1 % (ref 0–1.5)
BILIRUB SERPL-MCNC: 0.6 MG/DL (ref 0–1.2)
BUN SERPL-MCNC: 15 MG/DL (ref 8–23)
BUN/CREAT SERPL: 20 (ref 7–25)
CALCIUM SPEC-SCNC: 9.6 MG/DL (ref 8.6–10.5)
CHLORIDE SERPL-SCNC: 100 MMOL/L (ref 98–107)
CO2 SERPL-SCNC: 26 MMOL/L (ref 22–29)
CREAT SERPL-MCNC: 0.75 MG/DL (ref 0.57–1)
DEPRECATED RDW RBC AUTO: 45.9 FL (ref 37–54)
EGFRCR SERPLBLD CKD-EPI 2021: 86.3 ML/MIN/1.73
EOSINOPHIL # BLD AUTO: 0.12 10*3/MM3 (ref 0–0.4)
EOSINOPHIL NFR BLD AUTO: 3.1 % (ref 0.3–6.2)
ERYTHROCYTE [DISTWIDTH] IN BLOOD BY AUTOMATED COUNT: 13.5 % (ref 12.3–15.4)
GLOBULIN UR ELPH-MCNC: 2.8 GM/DL
GLUCOSE SERPL-MCNC: 87 MG/DL (ref 65–99)
HCT VFR BLD AUTO: 38 % (ref 34–46.6)
HGB BLD-MCNC: 12.2 G/DL (ref 12–15.9)
IGA1 MFR SER: 184 MG/DL (ref 70–400)
IGG1 SER-MCNC: 1256 MG/DL (ref 700–1600)
IGM SERPL-MCNC: 54 MG/DL (ref 40–230)
IMM GRANULOCYTES # BLD AUTO: 0.01 10*3/MM3 (ref 0–0.05)
IMM GRANULOCYTES NFR BLD AUTO: 0.3 % (ref 0–0.5)
LYMPHOCYTES # BLD AUTO: 1.08 10*3/MM3 (ref 0.7–3.1)
LYMPHOCYTES NFR BLD AUTO: 28.3 % (ref 19.6–45.3)
MCH RBC QN AUTO: 29 PG (ref 26.6–33)
MCHC RBC AUTO-ENTMCNC: 32.1 G/DL (ref 31.5–35.7)
MCV RBC AUTO: 90.5 FL (ref 79–97)
MONOCYTES # BLD AUTO: 0.47 10*3/MM3 (ref 0.1–0.9)
MONOCYTES NFR BLD AUTO: 12.3 % (ref 5–12)
NEUTROPHILS NFR BLD AUTO: 2.09 10*3/MM3 (ref 1.7–7)
NEUTROPHILS NFR BLD AUTO: 55 % (ref 42.7–76)
PLATELET # BLD AUTO: 232 10*3/MM3 (ref 140–450)
PMV BLD AUTO: 9.5 FL (ref 6–12)
POTASSIUM SERPL-SCNC: 4.3 MMOL/L (ref 3.5–5.2)
PROT SERPL-MCNC: 7.4 G/DL (ref 6–8.5)
RBC # BLD AUTO: 4.2 10*6/MM3 (ref 3.77–5.28)
SODIUM SERPL-SCNC: 136 MMOL/L (ref 136–145)
WBC NRBC COR # BLD: 3.81 10*3/MM3 (ref 3.4–10.8)

## 2023-10-16 PROCEDURE — 36415 COLL VENOUS BLD VENIPUNCTURE: CPT

## 2023-10-16 PROCEDURE — 80053 COMPREHEN METABOLIC PANEL: CPT

## 2023-10-16 PROCEDURE — 85025 COMPLETE CBC W/AUTO DIFF WBC: CPT

## 2023-10-16 PROCEDURE — 82787 IGG 1 2 3 OR 4 EACH: CPT

## 2023-10-16 PROCEDURE — 82784 ASSAY IGA/IGD/IGG/IGM EACH: CPT

## 2023-10-16 PROCEDURE — 82785 ASSAY OF IGE: CPT

## 2023-10-16 PROCEDURE — 71250 CT THORAX DX C-: CPT

## 2023-10-18 LAB
IGG SERPL-MCNC: 1244 MG/DL (ref 586–1602)
IGG1 SER-MCNC: 942 MG/DL (ref 248–810)
IGG2 SER-MCNC: 120 MG/DL (ref 130–555)
IGG3 SER-MCNC: 25 MG/DL (ref 15–102)
IGG4 SER-MCNC: 70 MG/DL (ref 2–96)

## 2023-10-20 LAB — IGE SERPL-ACNC: 11 IU/ML (ref 6–495)

## 2023-10-23 ENCOUNTER — OFFICE VISIT (OUTPATIENT)
Dept: PULMONOLOGY | Facility: CLINIC | Age: 68
End: 2023-10-23
Payer: MEDICARE

## 2023-10-23 ENCOUNTER — LAB (OUTPATIENT)
Dept: LAB | Facility: HOSPITAL | Age: 68
End: 2023-10-23
Payer: MEDICARE

## 2023-10-23 ENCOUNTER — HOSPITAL ENCOUNTER (OUTPATIENT)
Dept: GENERAL RADIOLOGY | Facility: HOSPITAL | Age: 68
Discharge: HOME | End: 2023-10-23
Payer: MEDICARE

## 2023-10-23 VITALS
WEIGHT: 97.8 LBS | DIASTOLIC BLOOD PRESSURE: 58 MMHG | HEIGHT: 64 IN | BODY MASS INDEX: 16.7 KG/M2 | SYSTOLIC BLOOD PRESSURE: 134 MMHG | OXYGEN SATURATION: 99 % | HEART RATE: 73 BPM | RESPIRATION RATE: 16 BRPM

## 2023-10-23 DIAGNOSIS — J30.2 SEASONAL ALLERGIES: Primary | ICD-10-CM

## 2023-10-23 DIAGNOSIS — R06.00 DYSPNEA, UNSPECIFIED TYPE: ICD-10-CM

## 2023-10-23 DIAGNOSIS — R05.9 COUGH, UNSPECIFIED TYPE: ICD-10-CM

## 2023-10-23 DIAGNOSIS — J18.9 PNEUMONIA DUE TO INFECTIOUS ORGANISM, UNSPECIFIED LATERALITY, UNSPECIFIED PART OF LUNG: ICD-10-CM

## 2023-10-23 DIAGNOSIS — J30.2 SEASONAL ALLERGIES: ICD-10-CM

## 2023-10-23 DIAGNOSIS — K21.9 GASTROESOPHAGEAL REFLUX DISEASE, UNSPECIFIED WHETHER ESOPHAGITIS PRESENT: ICD-10-CM

## 2023-10-23 DIAGNOSIS — R76.8 LOW SERUM IGG2 SUBCLASS LEVEL: ICD-10-CM

## 2023-10-23 DIAGNOSIS — J44.9 CHRONIC OBSTRUCTIVE PULMONARY DISEASE, UNSPECIFIED COPD TYPE: ICD-10-CM

## 2023-10-23 DIAGNOSIS — J47.9 BRONCHIECTASIS WITHOUT COMPLICATION: ICD-10-CM

## 2023-10-23 DIAGNOSIS — F17.201 TOBACCO ABUSE, IN REMISSION: ICD-10-CM

## 2023-10-23 DIAGNOSIS — R06.89 AIRWAY CLEARANCE IMPAIRMENT: ICD-10-CM

## 2023-10-23 DIAGNOSIS — G47.33 OSA (OBSTRUCTIVE SLEEP APNEA): ICD-10-CM

## 2023-10-23 DIAGNOSIS — J43.2 CENTRILOBULAR EMPHYSEMA: ICD-10-CM

## 2023-10-23 LAB
IGA1 MFR SER: 200 MG/DL (ref 70–400)
IGG1 SER-MCNC: 1381 MG/DL (ref 700–1600)
IGM SERPL-MCNC: 56 MG/DL (ref 40–230)

## 2023-10-23 PROCEDURE — 82784 ASSAY IGA/IGD/IGG/IGM EACH: CPT

## 2023-10-23 PROCEDURE — 1159F MED LIST DOCD IN RCRD: CPT | Performed by: NURSE PRACTITIONER

## 2023-10-23 PROCEDURE — 71046 X-RAY EXAM CHEST 2 VIEWS: CPT

## 2023-10-23 PROCEDURE — 99214 OFFICE O/P EST MOD 30 MIN: CPT | Performed by: NURSE PRACTITIONER

## 2023-10-23 PROCEDURE — 36415 COLL VENOUS BLD VENIPUNCTURE: CPT

## 2023-10-23 PROCEDURE — 82787 IGG 1 2 3 OR 4 EACH: CPT

## 2023-10-23 PROCEDURE — 1160F RVW MEDS BY RX/DR IN RCRD: CPT | Performed by: NURSE PRACTITIONER

## 2023-10-23 RX ORDER — UMECLIDINIUM 62.5 UG/1
1 AEROSOL, POWDER ORAL DAILY
COMMUNITY
Start: 2023-08-28 | End: 2023-10-23 | Stop reason: SDUPTHER

## 2023-10-23 RX ORDER — FLUTICASONE FUROATE AND VILANTEROL TRIFENATATE 100; 25 UG/1; UG/1
1 POWDER RESPIRATORY (INHALATION)
Qty: 3 EACH | Refills: 3 | Status: SHIPPED | OUTPATIENT
Start: 2023-10-23 | End: 2024-01-21

## 2023-10-23 RX ORDER — UMECLIDINIUM 62.5 UG/1
1 AEROSOL, POWDER ORAL DAILY
Qty: 90 EACH | Refills: 3 | Status: SHIPPED | OUTPATIENT
Start: 2023-10-23 | End: 2024-01-21

## 2023-10-25 LAB
IGG SERPL-MCNC: 1331 MG/DL (ref 586–1602)
IGG1 SER-MCNC: 1003 MG/DL (ref 248–810)
IGG2 SER-MCNC: 125 MG/DL (ref 130–555)
IGG3 SER-MCNC: 25 MG/DL (ref 15–102)
IGG4 SER-MCNC: 70 MG/DL (ref 2–96)

## 2024-01-11 ENCOUNTER — OFFICE VISIT (OUTPATIENT)
Dept: PULMONOLOGY | Facility: CLINIC | Age: 69
End: 2024-01-11
Payer: MEDICARE

## 2024-01-11 VITALS
OXYGEN SATURATION: 98 % | WEIGHT: 105.6 LBS | SYSTOLIC BLOOD PRESSURE: 134 MMHG | HEIGHT: 64 IN | DIASTOLIC BLOOD PRESSURE: 55 MMHG | BODY MASS INDEX: 18.03 KG/M2 | HEART RATE: 68 BPM | RESPIRATION RATE: 16 BRPM

## 2024-01-11 DIAGNOSIS — J44.9 CHRONIC OBSTRUCTIVE PULMONARY DISEASE, UNSPECIFIED COPD TYPE: ICD-10-CM

## 2024-01-11 DIAGNOSIS — F17.201 TOBACCO ABUSE, IN REMISSION: ICD-10-CM

## 2024-01-11 DIAGNOSIS — Z23 ENCOUNTER FOR IMMUNIZATION: Primary | ICD-10-CM

## 2024-01-11 DIAGNOSIS — J47.9 BRONCHIECTASIS WITHOUT COMPLICATION: ICD-10-CM

## 2024-01-11 RX ORDER — ALBUTEROL SULFATE 90 UG/1
2 AEROSOL, METERED RESPIRATORY (INHALATION) EVERY 4 HOURS PRN
COMMUNITY
Start: 2023-12-24

## 2024-01-11 RX ORDER — ALBUTEROL SULFATE 2.5 MG/3ML
SOLUTION RESPIRATORY (INHALATION)
COMMUNITY
Start: 2023-11-29

## 2024-01-11 RX ORDER — TRIAMCINOLONE ACETONIDE 1 MG/G
CREAM TOPICAL
COMMUNITY
Start: 2023-12-13

## 2024-01-11 NOTE — PROGRESS NOTES
Primary Care Provider  Dallin Maxwell APRN     Referring Provider  No ref. provider found     Chief Complaint  COPD and Follow-up (Pt here for 3 month follow up)    Subjective      History of Presenting Illness  68-year-old female with COPD here for follow-up.  Since last visit she had a chest CT done which showed stable biapical lung infiltrates/consolidation and diffuse emphysema.  PFTs in the past did show some airflow obstruction but her CT shows much worsening emphysema than what her lung function shows.  She was able to quit smoking in August 2023 and has remained cigarette free.  She has gained 17 pounds and she feels healthier.  She also feels like her dyspnea and cough have markedly improved.  She has a very mildly reduced IgG subclass 2 which is slightly below the normal limit.  She does not have any history of chronic infection.  She is doing well with current treatment of her allergies.  She remains on her current inhaler regimen.  She gets short of breath walking about 2-3 flights steps.  It is mild in severity, worse with activity and relieved with rest.  Denies any recent coughing or wheezing. Patient denies fever, chills, night sweats, swollen glands in the head and neck, unintentional weight loss, hemoptysis, purulent sputum production, dysphagia, chest pain, palpitations, chest tightness, abdominal pain, nausea, vomiting, and diarrhea.  Patient also denies any myalgias, changes in sense of taste and/or smell, sore throat, any other coronavirus or flu-like symptoms.  Patient denies any leg swelling, orthopnea, paroxysmal nocturnal dyspnea.  Patient is able to perform activities of daily living.    Family History   Problem Relation Age of Onset    Cancer Mother     Cancer Brother     COPD Brother     Malig Hyperthermia Neg Hx         Social History     Socioeconomic History    Marital status:    Tobacco Use    Smoking status: Former     Packs/day: 1.00     Years: 30.00     Additional  pack years: 0.00     Total pack years: 30.00     Types: Cigarettes     Start date:      Quit date: 2023     Years since quittin.4     Passive exposure: Past    Smokeless tobacco: Never   Vaping Use    Vaping Use: Never used   Substance and Sexual Activity    Alcohol use: Never    Drug use: Never    Sexual activity: Defer        Past Medical History:   Diagnosis Date    Allergies     Asthma     COPD (chronic obstructive pulmonary disease)     GERD (gastroesophageal reflux disease)     History of pneumonia     Hyperlipidemia     Sleep apnea         Immunization History   Administered Date(s) Administered    COVID-19 (PFIZER) Purple Cap Monovalent 2021, 2021, 2021    Flu Vaccine Intradermal Quad 18-64YR 10/05/2015, 2016    Flu Vaccine Quad PF 6-35MO 2017    Fluad Quad 65+ 10/18/2021    Fluzone High Dose =>65 Years ([employer] ONLY) 2013, 2014    Fluzone High-Dose 65+yrs 2013, 2014, 09/15/2020, 10/01/2021, 2022, 2023    Hep A, 2 Dose 2018, 2018    Hepatitis A 2018, 2018    Influenza MDCK Quadrivalent with Preserative 10/15/2018    Pneumococcal Conjugate 13-Valent (PCV13) 10/29/2015    Pneumococcal Conjugate 20-Valent (PCV20) 2023    Pneumococcal Polysaccharide (PPSV23) 2017    Shingrix 2022, 2022    Td (TDVAX) 10/13/2004    Tdap 10/13/2004, 2019       No Known Allergies       Current Outpatient Medications:     acetaminophen-codeine (TYLENOL #3) 300-30 MG per tablet, Take 1 tablet by mouth Every 4 (Four) Hours As Needed for Moderate Pain., Disp: , Rfl:     acetic acid-hydrocortisone (VOSOL-HC) 1-2 % otic solution, 2 (Two) Times a Day., Disp: , Rfl:     albuterol (PROVENTIL) (2.5 MG/3ML) 0.083% nebulizer solution, , Disp: , Rfl:     albuterol sulfate  (90 Base) MCG/ACT inhaler, Inhale 2 puffs Every 4 (Four) Hours As Needed., Disp: , Rfl:     atorvastatin (LIPITOR) 20 MG tablet, Take 1  "tablet by mouth Daily., Disp: , Rfl:     Breo Ellipta 100-25 MCG/ACT aerosol powder , Inhale 1 puff Daily for 90 days. Rinse mouth out after each use, Disp: 3 each, Rfl: 3    diclofenac (VOLTAREN) 75 MG EC tablet, Take 1 tablet by mouth Daily., Disp: , Rfl:     fluticasone (FLONASE) 50 MCG/ACT nasal spray, 1 spray into the nostril(s) as directed by provider Daily., Disp: , Rfl:     hydrOXYzine (ATARAX) 25 MG tablet, Take 1 tablet by mouth Every Night., Disp: , Rfl:     omeprazole (priLOSEC) 20 MG capsule, Take 1 capsule by mouth Daily., Disp: , Rfl:     potassium chloride (K-DUR,KLOR-CON) 20 MEQ CR tablet, Take 1 tablet by mouth Daily., Disp: , Rfl:     triamcinolone (KENALOG) 0.1 % cream, , Disp: , Rfl:     Umeclidinium Bromide (Incruse Ellipta) 62.5 MCG/ACT aerosol powder , Inhale 1 puff Daily for 90 days., Disp: 90 each, Rfl: 3     Objective     Physical Exam  Vital Signs:   WDWN, Alert, NAD.    HEENT:  PERRL, EOMI.  OP, nares clear  Chest: Diminished breath sounds.  Clear to auscultation bilaterally.  No wheezes, rales, or rhonchi appreciated.  Normal work of breathing noted.  Patient is able speak full sentences without difficulty.  CV: RRR, no MGR, pulses 2+, equal.  Abd:  Soft, NT, ND, + BS, no HSM  EXT:  no clubbing, no cyanosis, no edema  Neuro:  A&Ox3, CN grossly intact, no focal deficits.  Skin: No rashes or lesions noted.    /55 (BP Location: Left arm, Patient Position: Sitting, Cuff Size: Adult)   Pulse 68   Resp 16   Ht 162.6 cm (64.02\")   Wt 47.9 kg (105 lb 9.6 oz)   SpO2 98% Comment: room air  BMI 18.11 kg/m²         Result Review :   I personally reviewed Minerva Sanchez's last office note.  Last set of labs showed no chronic hypercapnia no evidence of peripheral eosinophilia.  IgE level was normal.  Immunoglobin showed a very mildly reduced IgG subclass 2 level.  Chest x-ray personally showing diffuse emphysema.  Chest CT personally reviewed showing chronic apical infiltrates versus " scarring bilaterally with diffuse emphysema.    CMP          2/28/2023    10:49 10/16/2023    09:41   CMP   Glucose 90  87    BUN 9  15    Creatinine 0.87  0.75    EGFR 72.2  86.3    Sodium 135  136    Potassium 3.8  4.3    Chloride 99  100    Calcium 9.1  9.6    Total Protein 7.8  7.4    Albumin 4.2  4.6    Globulin 3.6  2.8    Total Bilirubin 0.4  0.6    Alkaline Phosphatase 103  96    AST (SGOT) 16  22    ALT (SGPT) 8  12    Albumin/Globulin Ratio 1.2  1.6    BUN/Creatinine Ratio 10.3  20.0    Anion Gap 11.1  10.0      CBC          2/28/2023    10:49 10/16/2023    09:41   CBC   WBC 6.03  3.81    RBC 3.90  4.20    Hemoglobin 11.4  12.2    Hematocrit 34.0  38.0    MCV 87.2  90.5    MCH 29.2  29.0    MCHC 33.5  32.1    RDW 12.3  13.5    Platelets 322  232      CBC w/diff          2/28/2023    10:49 10/16/2023    09:41   CBC w/Diff   WBC 6.03  3.81    RBC 3.90  4.20    Hemoglobin 11.4  12.2    Hematocrit 34.0  38.0    MCV 87.2  90.5    MCH 29.2  29.0    MCHC 33.5  32.1    RDW 12.3  13.5    Platelets 322  232    Neutrophil Rel % 65.1  55.0    Immature Granulocyte Rel % 0.5  0.3    Lymphocyte Rel % 18.9  28.3    Monocyte Rel % 12.1  12.3    Eosinophil Rel % 2.2  3.1    Basophil Rel % 1.2  1.0           Assessment and Plan      Assessment:  COPD.  Gold stage C.  Alpha-1 antitrypsin with a normal genotype of MM with a level of 165.9.  Bronchiectasis without exacerbation  Chronic dyspnea at baseline  Seasonal allergies.  Controlled  Mucus plugging./Issues with airway clearance  Low IgG subclass 2.  Just below the lower limit of normal and very mildly reduced.  Not enough to cause any issues  GERD.  Patient is on a PPI.  Abnormal chest CT.  Obstructive sleep apnea. Declines CPAP machine.   Tobacco abuse of cigarettes in remission.  Remain cigarette free    Plan:  Applauded patient for quitting smoking in August and remaining cigarette free.  She has gained 17 pounds and is no longer severely malnourished.  Also her  respiratory status has improved.  Continue Breo, Incruse plus albuterol as needed  Has flutter valve and 3% saline nebs at home as needed  Continue Flonase  Does see sleep specialist but continues to decline CPAP  No need to refer to immunology for mildly reduced IgG subclass 2.  For this to cause chronic infections, it needs to be dramatically low.  This is just slightly below the lower limit of normal  No indication to repeat chest CT at this time.  We can enroll her in lung cancer screening later in 2024  Vaccination status: patient reports they are up-to-date with flu, pneumonia, and Covid vaccines.  RSV vaccine today  Smoking status: Patient is a former cigarette smoker.  Quit in August 2023    Follow Up   Return in about 6 months (around 7/11/2024).  Patient was given instructions and counseling regarding her condition or for health maintenance advice. Please see specific information pulled into the AVS if appropriate.     Electronically signed by Sam Haddad MD, 01/11/24, 11:01 AM EST.

## 2024-04-19 ENCOUNTER — HOSPITAL ENCOUNTER (OUTPATIENT)
Dept: CT IMAGING | Facility: HOSPITAL | Age: 69
Discharge: HOME | End: 2024-04-19
Payer: MEDICARE

## 2024-04-19 DIAGNOSIS — R76.8 LOW SERUM IGG2 SUBCLASS LEVEL: ICD-10-CM

## 2024-04-19 DIAGNOSIS — J30.2 SEASONAL ALLERGIES: ICD-10-CM

## 2024-04-19 DIAGNOSIS — K21.9 GASTROESOPHAGEAL REFLUX DISEASE, UNSPECIFIED WHETHER ESOPHAGITIS PRESENT: ICD-10-CM

## 2024-04-19 DIAGNOSIS — J18.9 PNEUMONIA DUE TO INFECTIOUS ORGANISM, UNSPECIFIED LATERALITY, UNSPECIFIED PART OF LUNG: ICD-10-CM

## 2024-04-19 DIAGNOSIS — R06.89 AIRWAY CLEARANCE IMPAIRMENT: ICD-10-CM

## 2024-04-19 DIAGNOSIS — J44.9 CHRONIC OBSTRUCTIVE PULMONARY DISEASE, UNSPECIFIED COPD TYPE: ICD-10-CM

## 2024-04-19 DIAGNOSIS — J47.9 BRONCHIECTASIS WITHOUT COMPLICATION: ICD-10-CM

## 2024-04-19 DIAGNOSIS — R05.9 COUGH, UNSPECIFIED TYPE: ICD-10-CM

## 2024-04-19 DIAGNOSIS — R06.00 DYSPNEA, UNSPECIFIED TYPE: ICD-10-CM

## 2024-04-19 PROCEDURE — 71250 CT THORAX DX C-: CPT

## 2024-05-13 RX ORDER — ALBUTEROL SULFATE 2.5 MG/3ML
SOLUTION RESPIRATORY (INHALATION)
Qty: 1620 ML | Refills: 3 | Status: SHIPPED | OUTPATIENT
Start: 2024-05-13

## 2024-06-07 ENCOUNTER — OFFICE VISIT (OUTPATIENT)
Dept: ORTHOPEDIC SURGERY | Facility: CLINIC | Age: 69
End: 2024-06-07
Payer: MEDICARE

## 2024-06-07 VITALS — HEIGHT: 64 IN | BODY MASS INDEX: 19.36 KG/M2 | WEIGHT: 113.4 LBS | TEMPERATURE: 98.2 F

## 2024-06-07 DIAGNOSIS — M84.48XA SACRAL INSUFFICIENCY FRACTURE, INITIAL ENCOUNTER: Primary | ICD-10-CM

## 2024-06-07 PROCEDURE — 99214 OFFICE O/P EST MOD 30 MIN: CPT | Performed by: NURSE PRACTITIONER

## 2024-06-07 RX ORDER — BACLOFEN 10 MG/1
TABLET ORAL
COMMUNITY
Start: 2024-04-11

## 2024-06-07 RX ORDER — ERGOCALCIFEROL 1.25 MG/1
CAPSULE ORAL
COMMUNITY
Start: 2024-05-15

## 2024-06-07 NOTE — PROGRESS NOTES
Patient Name: Melodie Whittintgon   YOB: 1955  Referring Primary Care Physician: Dallin aMxwell APRN      Chief Complaint:    Chief Complaint   Patient presents with    Lumbar Spine - Pain, Initial Evaluation        Previous Treatment:   04/17/2024 - MRI  PT? Yes - [facility]    Back Pain  This is a new problem. The current episode started more than 1 month ago. The problem occurs intermittently. The problem has been improved since onset. The pain is present in the lumbar spine. The quality of the pain is described as aching. The pain radiates to the left buttock and right buttock. The pain is at a severity of 4/10. The pain is moderate. The pain is Worse during the night. The symptoms are aggravated by sitting and lying down. Stiffness is present All day. Pertinent negatives include no bladder incontinence, bowel incontinence, leg pain, numbness, tingling or weakness. She has tried muscle relaxant, NSAIDs and heat for the symptoms. The treatment provided mild relief.        HPI:  Melodie Whittington is a 68 y.o. female who presents to [facility] for evaluation of above complaints.  Patient was referred for evaluation of sacral fracture.  She denies any injury.  Symptoms started over 2 months ago.  She presented to ER 04/05/2024 and had lumbar MRI.  Based on continuation of symptoms and question of abnormal signal in the sacral ala, her PCP ordered sacral MRI and she is here to discuss results and treatment options.  She denies any pain, numbness or tingling in lower extremities.  She denies any bowel or bladder dysfunction, but does report urinary frequency.  She said for about a week she utilized a walker to ambulate, otherwise has been able to function on her own at home.  She reports she was initially having stabbing and shooting pain into the right buttock.  She completed 4 weeks of physical therapy.  The shooting pain has improved with time, but still has fairly life  "limiting sacral pain.  This is a new patient to the practice and new to me.  Prior pertinent records were reviewed.    PFSH:  See attached    ROS: As per HPI, otherwise negative    Objective:      68 y.o. female  Body mass index is 19.47 kg/m²., 51.4 kg (113 lb 6.4 oz), @HT@  Vitals:    06/07/24 1032   Temp: 98.2 °F (36.8 °C)     Pain Score    06/07/24 1032   PainSc:   6   PainLoc: Back            Spine Musculoskeletal Exam    Gait    Gait is normal.    Inspection    Kyphosis: thoracic kyphosis    Palpation    Thoracolumbar    Tenderness: present      Sacrum: right      Spinous process: lower lumbar    Right      Muscle tone: normal    Left      Muscle tone: normal    Strength    Thoracolumbar    Thoracolumbar motor exam is normal.       Right      Hip flexion is affected by pain.        Left      Hip flexion is affected by pain.      Sensory    Thoracolumbar    Thoracolumbar sensation is normal.    Reflexes    Right      Quadriceps: 1/4      Achilles: 1/4     Left      Quadriceps: 1/4      Achilles: 1/4    Special Tests    Thoracolumbar      Right      GELY test: negative      SLR: back pain      Left      GELY test: negative      SLR: no back or leg pain    General      Constitutional: well-developed and well-nourished    Scleral icterus: no    Labored breathing: no    Psychiatric: normal mood and affect and no acute distress    Neurological: alert and oriented x3    Skin: intact        IMAGING:       No imaging in office today.  Sacral MRI 05/14/2024 read as \"there are abnormal signal changes in the sacral body at the level of   S1-S2 with associated cortical irregularity and bone marrow edema   (series 10, image 18), compatible with fracture. There are abnormal   signal changes in the bilateral sacral alae with the T1/T2 hypointense   sclerotic lines and extensive surrounding edema (series 4, image 12),   compatible with insufficiency fractures. There is a small amount of bone   marrow edema also seen in the " "right iliac body adjacent to the SI joint.   No discrete osseous lesion identified. No acute soft tissue abnormality.\"  Assessment:           Diagnoses and all orders for this visit:    1. Sacral insufficiency fracture, initial encounter (Primary)  -     MRI Pelvis With & Without Contrast; Future             Plan:  Although it is not uncommon to still be symptomatic 10 weeks out, she denies any trauma and although the radiologist does not identify any discrete osseous lesion, the marrow signal is quite diffuse and she did smoke for about 50 years.  Imaging was reviewed with Dr Rios and he agrees to have patient follow-up with MRI with contrast.  Will see her back after that MRI on a day that Dr. Sepulveda is present in case she needs surgical intervention.  In the meantime, she was advised to continue to use the walker to off load her weight while ambulating and she can try a neoprene style Velcro lumbar brace worn more around her hips for support.  Can also utilize lidocaine patch as needed.    No follow-ups on file.    EMR Dragon/Transcription Disclaimer:   Much of this encounter note is an electronic transcription/translation of spoken language to printed text. The electronic translation of spoken language may permit erroneous, or at times, nonsensical words or phrases to be inadvertently transcribed; Although I have reviewed the note for such errors, some may still exist.  Red flags have been discussed at this or previous visits to include but not limited weakness in extremities, worsening pain that does not respond to conservative treatment and bowel or bladder dysfunction. These are reasons to present to ER and patient has been informed.    The diagnosis(es), natural history, pathophysiology and treatment for diagnosis(es) were discussed. Opportunity given and questions answered. Biomechanics of pertinent body areas discussed.    EXERCISES:  Advice on benefits of, and types of regular/moderate exercise pertaining " to diagnosis.  Continue HEP. For back or neck pain, recommend pilates and or yoga as tolerated. Generally it is best to start any new exercise under the guidance of a  or therapist.   MEDICATIONS:  When prescribe, the risks, benefits, warnings,side effects and alternatives of medications discussed. Advised against long term use of narcotics.   PAIN CONTROL:  Cold, heat, OTC lidocaine patches and/or ointment as needed. Avoid direct skin contact with ice. Ice 15-20 minutes 3-4 times daily as needed. For SI joint pain, recommend ice bath in water about 50 degrees for 5 consecutive days, add ice slowly to help with adjustment and may cover with warm towel or robe to help with cold tolerance. If using lidocaine, do not apply heat in conjunction as this can cause a burn.   MEDICAL RECORDS reviewed from other provider(s) for past and current medical history pertinent to this visit..

## 2024-06-13 ENCOUNTER — TELEPHONE (OUTPATIENT)
Dept: ORTHOPEDIC SURGERY | Facility: CLINIC | Age: 69
End: 2024-06-13
Payer: MEDICARE

## 2024-06-13 DIAGNOSIS — M84.48XA SACRAL INSUFFICIENCY FRACTURE, INITIAL ENCOUNTER: Primary | ICD-10-CM

## 2024-07-12 ENCOUNTER — OFFICE VISIT (OUTPATIENT)
Dept: ORTHOPEDIC SURGERY | Facility: CLINIC | Age: 69
End: 2024-07-12
Payer: MEDICARE

## 2024-07-12 ENCOUNTER — ANCILLARY ORDERS (OUTPATIENT)
Dept: OTHER | Facility: HOSPITAL | Age: 69
End: 2024-07-12
Payer: MEDICARE

## 2024-07-12 VITALS — HEIGHT: 64 IN | BODY MASS INDEX: 19.5 KG/M2 | TEMPERATURE: 97.3 F | WEIGHT: 114.2 LBS

## 2024-07-12 DIAGNOSIS — M84.48XG SACRAL INSUFFICIENCY FRACTURE WITH DELAYED HEALING, SUBSEQUENT ENCOUNTER: Primary | ICD-10-CM

## 2024-07-12 PROCEDURE — 1159F MED LIST DOCD IN RCRD: CPT | Performed by: NURSE PRACTITIONER

## 2024-07-12 PROCEDURE — 1160F RVW MEDS BY RX/DR IN RCRD: CPT | Performed by: NURSE PRACTITIONER

## 2024-07-12 PROCEDURE — 99213 OFFICE O/P EST LOW 20 MIN: CPT | Performed by: NURSE PRACTITIONER

## 2024-07-12 NOTE — LETTER
July 12, 2024       No Recipients    Patient: Melodie Whittington   YOB: 1955   Date of Visit: 7/12/2024     Dear KAMLA Wheeler:       Thank you for referring Melodie Whittington to me for evaluation. Below are the relevant portions of my assessment and plan of care.  Patient was advised to follow-up with you for DEXA scan if not done in the past 2 to 3 years and possible referral to endocrinology for alternate treatment of osteoporosis given her history.    If you have questions, please do not hesitate to call me. I look forward to following Melodie along with you.         Sincerely,        KAMLA Galo        CC:   No Recipients    Urvashi Blount APRN  07/12/24 1211  Signed  Patient Name: Melodie Whittington   YOB: 1955  Referring Primary Care Physician: Dallin Maxwell APRN      Chief Complaint:    Chief Complaint   Patient presents with   • Lumbar Spine - Follow-up, Pain          HPI:  Melodie Whittington is a 68 y.o. female who presents to [facility] for follow-up of sacral fracture.  Pain is slowly improving.  Recall the sacral fractures were without trauma.  She continues to use the walker which she does state helps with ambulation.  She also purchased a neoprene style lumbar brace that she is wearing a lower around her hips that seems to be helping as well.  Denies any pain radiating down lower extremities.  No bowel or bladder dysfunction or saddle anesthesia.    PFSH:  See attached    ROS: As per HPI, otherwise negative    Objective:      68 y.o. female  Body mass index is 19.6 kg/m²., 51.8 kg (114 lb 3.2 oz), @HT@  Vitals:    07/12/24 0931   Temp: 97.3 °F (36.3 °C)     Pain Score    07/12/24 0931   PainSc:   6   PainLoc: Back            Spine Musculoskeletal Exam    Gait    Assistive device: walker    Palpation    Thoracolumbar    Right      Muscle tone: normal    Left      Muscle tone: normal    Strength    Thoracolumbar     "Thoracolumbar motor exam is normal.       Sensory    Thoracolumbar    Thoracolumbar sensation is normal.    Reflexes    Right      Quadriceps: 2/4    Left      Quadriceps: 2/4    Special Tests    Thoracolumbar      Right      SLR: no back or leg pain      Left      SLR: no back or leg pain    General      Constitutional: well-developed and well-nourished    Scleral icterus: no    Labored breathing: no    Psychiatric: normal mood and affect and no acute distress    Neurological: alert and oriented x3    Skin: intact        IMAGING:     No imaging is in office today.  MRI with contrast of the pelvis was reviewed and radiologist report \"findings are compatible with acute to subacute bilateral sacral ala fractures S2 3, sacral body fractures compatible with bilateral sacral insufficiency fractures.  No discrete osseous lesion identified.  No acute soft tissue abnormality.\"  Imaging was reviewed with Dr. Sepulveda as well.    Assessment:           Diagnoses and all orders for this visit:    1. Sacral insufficiency fracture with delayed healing, subsequent encounter (Primary)             Plan:  Fortunately her pain has improved, however slowly.  Fairly typical course for sacral fractures in her age group and known osteoporosis.  She will continue to utilize the walker for ambulation.  She was advised that once the pain subsides she can wean off of the brace and the walker.  She was advised to avoid any heavy lifting.  For now, would limit lifting to 5 pounds until pain is completely resolved and then lifelong may be up to 10 pounds but of advised to do so with proper body mechanics.  She has known osteoporosis per her report and previously treated with infusions but had to be discontinued due to dental work.  Advised her to follow-up with her PCP regarding repeat DEXA scan and perhaps referral to endocrinology if warranted given the nature of these fractures.  She says her sister also currently has vertebral body fractures " without trauma.  For now we will have her follow-up on an as-needed basis, we has discussed red flags and she will notify the office if symptoms change.  With any bowel or bladder dysfunction or severe pain she was advised to present to the emergency room, but hopefully these will continue to heal on their own.      Return if symptoms worsen or fail to improve.    EMR Dragon/Transcription Disclaimer:   Much of this encounter note is an electronic transcription/translation of spoken language to printed text. The electronic translation of spoken language may permit erroneous, or at times, nonsensical words or phrases to be inadvertently transcribed; Although I have reviewed the note for such errors, some may still exist.  Red flags have been discussed at this or previous visits to include but not limited weakness in extremities, worsening pain that does not respond to conservative treatment and bowel or bladder dysfunction. These are reasons to present to ER and patient has been informed.    The diagnosis(es), natural history, pathophysiology and treatment for diagnosis(es) were discussed. Opportunity given and questions answered. Biomechanics of pertinent body areas discussed.    EXERCISES:  Advice on benefits of, and types of regular/moderate exercise pertaining to diagnosis.  Continue HEP. For back or neck pain, recommend pilates and or yoga as tolerated. Generally it is best to start any new exercise under the guidance of a  or therapist.   MEDICATIONS:  When prescribe, the risks, benefits, warnings,side effects and alternatives of medications discussed. Advised against long term use of narcotics.   PAIN CONTROL:  Cold, heat, OTC lidocaine patches and/or ointment as needed. Avoid direct skin contact with ice. Ice 15-20 minutes 3-4 times daily as needed. For SI joint pain, recommend ice bath in water about 50 degrees for 5 consecutive days, add ice slowly to help with adjustment and may cover with warm towel  or robe to help with cold tolerance. If using lidocaine, do not apply heat in conjunction as this can cause a burn.   MEDICAL RECORDS reviewed from other provider(s) for past and current medical history pertinent to this visit..

## 2024-07-12 NOTE — PROGRESS NOTES
"Patient Name: Melodie hWittington   YOB: 1955  Referring Primary Care Physician: Dallin Maxwell APRN      Chief Complaint:    Chief Complaint   Patient presents with    Lumbar Spine - Follow-up, Pain          HPI:  Melodie Whittington is a 68 y.o. female who presents to [facility] for follow-up of sacral fracture.  Pain is slowly improving.  Recall the sacral fractures were without trauma.  She continues to use the walker which she does state helps with ambulation.  She also purchased a neoprene style lumbar brace that she is wearing a lower around her hips that seems to be helping as well.  Denies any pain radiating down lower extremities.  No bowel or bladder dysfunction or saddle anesthesia.    PFSH:  See attached    ROS: As per HPI, otherwise negative    Objective:      68 y.o. female  Body mass index is 19.6 kg/m²., 51.8 kg (114 lb 3.2 oz), @HT@  Vitals:    07/12/24 0931   Temp: 97.3 °F (36.3 °C)     Pain Score    07/12/24 0931   PainSc:   6   PainLoc: Back            Spine Musculoskeletal Exam    Gait    Assistive device: walker    Palpation    Thoracolumbar    Right      Muscle tone: normal    Left      Muscle tone: normal    Strength    Thoracolumbar    Thoracolumbar motor exam is normal.       Sensory    Thoracolumbar    Thoracolumbar sensation is normal.    Reflexes    Right      Quadriceps: 2/4    Left      Quadriceps: 2/4    Special Tests    Thoracolumbar      Right      SLR: no back or leg pain      Left      SLR: no back or leg pain    General      Constitutional: well-developed and well-nourished    Scleral icterus: no    Labored breathing: no    Psychiatric: normal mood and affect and no acute distress    Neurological: alert and oriented x3    Skin: intact        IMAGING:     No imaging is in office today.  MRI with contrast of the pelvis was reviewed and radiologist report \"findings are compatible with acute to subacute bilateral sacral ala fractures S2 3, " "sacral body fractures compatible with bilateral sacral insufficiency fractures.  No discrete osseous lesion identified.  No acute soft tissue abnormality.\"  Imaging was reviewed with Dr. Sepulveda as well.    Assessment:           Diagnoses and all orders for this visit:    1. Sacral insufficiency fracture with delayed healing, subsequent encounter (Primary)             Plan:  Fortunately her pain has improved, however slowly.  Fairly typical course for sacral fractures in her age group and known osteoporosis.  She will continue to utilize the walker for ambulation.  She was advised that once the pain subsides she can wean off of the brace and the walker.  She was advised to avoid any heavy lifting.  For now, would limit lifting to 5 pounds until pain is completely resolved and then lifelong may be up to 10 pounds but of advised to do so with proper body mechanics.  She has known osteoporosis per her report and previously treated with infusions but had to be discontinued due to dental work.  Advised her to follow-up with her PCP regarding repeat DEXA scan and perhaps referral to endocrinology if warranted given the nature of these fractures.  She says her sister also currently has vertebral body fractures without trauma.  For now we will have her follow-up on an as-needed basis, we has discussed red flags and she will notify the office if symptoms change.  With any bowel or bladder dysfunction or severe pain she was advised to present to the emergency room, but hopefully these will continue to heal on their own.      Return if symptoms worsen or fail to improve.    EMR Dragon/Transcription Disclaimer:   Much of this encounter note is an electronic transcription/translation of spoken language to printed text. The electronic translation of spoken language may permit erroneous, or at times, nonsensical words or phrases to be inadvertently transcribed; Although I have reviewed the note for such errors, some may still " exist.  Red flags have been discussed at this or previous visits to include but not limited weakness in extremities, worsening pain that does not respond to conservative treatment and bowel or bladder dysfunction. These are reasons to present to ER and patient has been informed.    The diagnosis(es), natural history, pathophysiology and treatment for diagnosis(es) were discussed. Opportunity given and questions answered. Biomechanics of pertinent body areas discussed.    EXERCISES:  Advice on benefits of, and types of regular/moderate exercise pertaining to diagnosis.  Continue HEP. For back or neck pain, recommend pilates and or yoga as tolerated. Generally it is best to start any new exercise under the guidance of a  or therapist.   MEDICATIONS:  When prescribe, the risks, benefits, warnings,side effects and alternatives of medications discussed. Advised against long term use of narcotics.   PAIN CONTROL:  Cold, heat, OTC lidocaine patches and/or ointment as needed. Avoid direct skin contact with ice. Ice 15-20 minutes 3-4 times daily as needed. For SI joint pain, recommend ice bath in water about 50 degrees for 5 consecutive days, add ice slowly to help with adjustment and may cover with warm towel or robe to help with cold tolerance. If using lidocaine, do not apply heat in conjunction as this can cause a burn.   MEDICAL RECORDS reviewed from other provider(s) for past and current medical history pertinent to this visit..

## 2024-11-18 ENCOUNTER — OFFICE VISIT (OUTPATIENT)
Dept: PULMONOLOGY | Facility: CLINIC | Age: 69
End: 2024-11-18
Payer: MEDICARE

## 2024-11-18 VITALS
HEIGHT: 65 IN | HEART RATE: 62 BPM | WEIGHT: 115 LBS | BODY MASS INDEX: 19.16 KG/M2 | TEMPERATURE: 97.4 F | DIASTOLIC BLOOD PRESSURE: 51 MMHG | OXYGEN SATURATION: 100 % | SYSTOLIC BLOOD PRESSURE: 130 MMHG | RESPIRATION RATE: 16 BRPM

## 2024-11-18 DIAGNOSIS — R76.8 LOW SERUM IGG2 SUBCLASS LEVEL: ICD-10-CM

## 2024-11-18 DIAGNOSIS — Z87.891 HISTORY OF NICOTINE DEPENDENCE: ICD-10-CM

## 2024-11-18 DIAGNOSIS — G47.33 OSA (OBSTRUCTIVE SLEEP APNEA): ICD-10-CM

## 2024-11-18 DIAGNOSIS — J43.2 CENTRILOBULAR EMPHYSEMA: ICD-10-CM

## 2024-11-18 DIAGNOSIS — Z87.891 PERSONAL HISTORY OF TOBACCO USE, PRESENTING HAZARDS TO HEALTH: Primary | ICD-10-CM

## 2024-11-18 DIAGNOSIS — J47.9 BRONCHIECTASIS WITHOUT COMPLICATION: ICD-10-CM

## 2024-11-18 PROCEDURE — 1160F RVW MEDS BY RX/DR IN RCRD: CPT | Performed by: INTERNAL MEDICINE

## 2024-11-18 PROCEDURE — G0296 VISIT TO DETERM LDCT ELIG: HCPCS | Performed by: INTERNAL MEDICINE

## 2024-11-18 PROCEDURE — 1159F MED LIST DOCD IN RCRD: CPT | Performed by: INTERNAL MEDICINE

## 2024-11-18 PROCEDURE — 99214 OFFICE O/P EST MOD 30 MIN: CPT | Performed by: INTERNAL MEDICINE

## 2024-11-18 RX ORDER — ALENDRONATE SODIUM 70 MG/1
70 TABLET ORAL
COMMUNITY
Start: 2024-09-23

## 2024-11-18 RX ORDER — ACETIC ACID 20.65 MG/ML
4 SOLUTION AURICULAR (OTIC)
COMMUNITY
Start: 2024-07-15

## 2024-11-18 RX ORDER — POTASSIUM CHLORIDE 1500 MG/1
20 TABLET, EXTENDED RELEASE ORAL DAILY
COMMUNITY
Start: 2024-07-15

## 2024-11-18 NOTE — PROGRESS NOTES
Primary Care Provider  Dallin Maxwell APRN     Referring Provider  No ref. provider found     Chief Complaint  Follow-up (6 month follow up), Centrilobular emphysema, and COPD    Subjective      History of Presenting Illness  68-year-old female with COPD here for follow-up.  She has done much better since quitting smoking.  Her weight is stable.  She has some dyspnea with activity which is at baseline.  No cough.  She is short of breath walking about 1500 feet or up 2-3 flights steps.  Dyspnea is moderate severity, worse with activity and relieved with rest.  She remained cigarette free for over a year.  Denies any recent coughing or wheezing. Patient denies fever, chills, night sweats, swollen glands in the head and neck, unintentional weight loss, hemoptysis, purulent sputum production, dysphagia, chest pain, palpitations, chest tightness, abdominal pain, nausea, vomiting, and diarrhea.  Patient also denies any myalgias, changes in sense of taste and/or smell, sore throat, any other coronavirus or flu-like symptoms.  Patient denies any leg swelling, orthopnea, paroxysmal nocturnal dyspnea.  Patient is able to perform activities of daily living.    Family History   Problem Relation Age of Onset    Cancer Mother     Cancer Brother     COPD Brother     Malig Hyperthermia Neg Hx         Social History     Socioeconomic History    Marital status:    Tobacco Use    Smoking status: Former     Current packs/day: 0.00     Average packs/day: 1 pack/day for 50.6 years (50.6 ttl pk-yrs)     Types: Cigarettes     Start date:      Quit date: 2023     Years since quittin.2     Passive exposure: Past    Smokeless tobacco: Never   Vaping Use    Vaping status: Never Used   Substance and Sexual Activity    Alcohol use: Never    Drug use: Never    Sexual activity: Defer        Past Medical History:   Diagnosis Date    Allergies     Asthma     COPD (chronic obstructive pulmonary disease)     GERD  (gastroesophageal reflux disease)     History of pneumonia     Hyperlipidemia     Sleep apnea         Immunization History   Administered Date(s) Administered    31-influenza Vac Quardvalent Preservativ 09/19/2017    Arexvy (RSV, Adults 60+ yrs) 01/11/2024    COVID-19 (PFIZER) Purple Cap Monovalent 04/29/2021, 05/20/2021, 12/09/2021    FLUAD TRI 65YR+ 09/06/2024    Flu Vaccine Intradermal Quad 18-64YR 10/05/2015, 09/28/2016    Flu Vaccine Quad PF 6-35MO 09/19/2017    Fluad Quad 65+ 10/18/2021    Fluzone (or Fluarix & Flulaval for VFC) >6mos 10/05/2015, 09/15/2020    Fluzone High-Dose 65+YRS 12/12/2013, 09/18/2014    Fluzone High-Dose 65+yrs 12/12/2013, 09/18/2014, 09/15/2020, 10/01/2021, 09/05/2022, 09/27/2023    Hep A, 2 Dose 05/21/2018, 11/29/2018    Hepatitis A 05/20/2018, 11/29/2018    Influenza MDCK Quadrivalent with Preserative 10/15/2018    Influenza, Unspecified 10/01/2019    Pneumococcal Conjugate 13-Valent (PCV13) 10/29/2015    Pneumococcal Conjugate 20-Valent (PCV20) 02/13/2023    Pneumococcal Polysaccharide (PPSV23) 09/19/2017    Shingrix 09/05/2022, 11/04/2022    Td (TDVAX) 10/13/2004    Tdap 10/13/2004, 02/26/2019       No Known Allergies       Current Outpatient Medications:     acetaminophen-codeine (TYLENOL #3) 300-30 MG per tablet, Take 1 tablet by mouth Every 4 (Four) Hours As Needed for Moderate Pain., Disp: , Rfl:     acetic acid (VOSOL) 2 % otic solution, 4 drops., Disp: , Rfl:     acetic acid-hydrocortisone (VOSOL-HC) 1-2 % otic solution, 2 (Two) Times a Day., Disp: , Rfl:     albuterol (PROVENTIL) (2.5 MG/3ML) 0.083% nebulizer solution, USE 1 VIAL VIA NEBULIZER EVERY 4 HOURS AS NEEDED FOR WHEEZING OR  SHORTNESS OF AIR FOR UP TO 90  DAYS, Disp: 1620 mL, Rfl: 3    albuterol sulfate  (90 Base) MCG/ACT inhaler, Inhale 2 puffs Every 4 (Four) Hours As Needed., Disp: , Rfl:     alendronate (FOSAMAX) 70 MG tablet, Take 1 tablet by mouth., Disp: , Rfl:     atorvastatin (LIPITOR) 20 MG tablet,  "Take 1 tablet by mouth Daily., Disp: , Rfl:     baclofen (LIORESAL) 10 MG tablet, , Disp: , Rfl:     diclofenac (VOLTAREN) 75 MG EC tablet, Take 1 tablet by mouth Daily., Disp: , Rfl:     fluticasone (FLONASE) 50 MCG/ACT nasal spray, Administer 1 spray into the nostril(s) as directed by provider Daily., Disp: , Rfl:     hydrOXYzine (ATARAX) 25 MG tablet, Take 1 tablet by mouth Every Night., Disp: , Rfl:     omeprazole (priLOSEC) 20 MG capsule, Take 1 capsule by mouth Daily., Disp: , Rfl:     potassium chloride (K-DUR,KLOR-CON) 20 MEQ CR tablet, Take 1 tablet by mouth Daily., Disp: , Rfl:     potassium chloride ER (K-TAB) 20 MEQ tablet controlled-release ER tablet, Take 1 tablet by mouth Daily., Disp: , Rfl:     triamcinolone (KENALOG) 0.1 % cream, , Disp: , Rfl:     vitamin D (ERGOCALCIFEROL) 1.25 MG (72229 UT) capsule capsule, , Disp: , Rfl:     Breo Ellipta 100-25 MCG/ACT aerosol powder , Inhale 1 puff Daily for 90 days. Rinse mouth out after each use, Disp: 3 each, Rfl: 3     Objective     Physical Exam  Vital Signs:   WDWN, Alert, NAD.    HEENT:  PERRL, EOMI.  OP, nares clear  Chest: Diminished breath sounds.  Clear to auscultation bilaterally.  No wheezes, rales, or rhonchi appreciated.  Normal work of breathing noted.  Patient is able speak full sentences without difficulty.  CV: RRR, no MGR, pulses 2+, equal.  Abd:  Soft, NT, ND, + BS, no HSM  EXT:  no clubbing, no cyanosis, no edema  Neuro:  A&Ox3, CN grossly intact, no focal deficits.  Skin: No rashes or lesions noted.    /51 (BP Location: Left arm, Patient Position: Sitting, Cuff Size: Adult)   Pulse 62   Temp 97.4 °F (36.3 °C) (Oral)   Resp 16   Ht 165.1 cm (65\")   Wt 52.2 kg (115 lb)   SpO2 100% Comment: room air  BMI 19.14 kg/m²         Result Review :   I personally reviewed my last office note.  CBC and CMP personally reviewed showing no peripheral eosinophilia and no evidence of chronic hypercapnia.  Last chest CT personally reviewed " showed stable peripheral areas of consolidation and emphysematous changes.         Assessment and Plan      Assessment:  COPD.  Gold stage C.  Alpha-1 antitrypsin with a normal genotype of MM with a level of 165.9.  Bronchiectasis without exacerbation  Chronic dyspnea at baseline  Seasonal allergies.  Controlled  Mucus plugging./Issues with airway clearance  Low IgG subclass 2.  Just below the lower limit of normal and very mildly reduced.  Not enough to cause any issues  GERD.  Patient is on a PPI.  Obstructive sleep apnea. Declines CPAP machine.   Tobacco abuse of cigarettes in remission.  Remain cigarette free    Plan:  Ongoing smoking cessation applauded  Continue Breo, Incruse plus albuterol as needed  Has flutter valve and 3% saline nebs at home as needed  Continue Flonase  Does have sleep apnea but declined CPAP  No need to refer to immunology for mildly reduced IgG subclass 2.  For this to cause chronic infections, it needs to be dramatically low.  This is just slightly below the lower limit of normal  Shared decision making regarding lung cancer screening performed in the office today.  Please see risk versus benefits part of this note for further details.  The patient is amendable to screening and I have placed LDCT order for lung cancer screening.  Placed order for LDCT in April 2025  Vaccination status: patient reports they are up-to-date with flu, pneumonia, and RSV vaccines  Smoking status: Patient is a former cigarette smoker.  Quit in August 2023  Medications reviewed and reconciled    Follow Up   Return in about 6 months (around 5/18/2025).  Patient was given instructions and counseling regarding her condition or for health maintenance advice. Please see specific information pulled into the AVS if appropriate.      Low-Dose Lung Cancer CT Screening Visit    CHIEF COMPLAINT:    Shared Decision Making  I am discussing tobacco cessation today with Melodie Whittington    SMOKING HISTORY:     Social History      Tobacco Use   Smoking Status Former    Current packs/day: 0.00    Average packs/day: 1 pack/day for 50.6 years (50.6 ttl pk-yrs)    Types: Cigarettes    Start date:     Quit date: 2023    Years since quittin.2    Passive exposure: Past   Smokeless Tobacco Never       SUBJECTIVE:     Melodie Whittington is a former smoker quitting 1 years ago with a  50  pack year history.  she reports no use of alternate forms of tobacco, electronic cigarettes, marijuana or other substances.  Based on the recommendation of the [place] Preventive Services Task Force, this patient is at high risk for lung cancer and a low-dose CT screening scan is recommended.     The patient has had no hemoptysis, unintentional weight loss or increasing shortness of breath. The patient is asymptomatic and has no signs or symptoms of lung cancer.     Together we discussed the potential benefits and potential harms of being screened for lung cancer including the potential for follow up diagnostic testing, risk for over diagnosis, false positive rate and radiation exposure using the [facility] Lung Cancer Screening Shared Decision-Making Tool. A copy of this decision aid resource has been provided in the after visit summary.  We also reviewed the patient's smoking history and counseled her on the importance and health benefits of maintaining cigarette smoking abstinence.        Continued Smoking Abstinence discussion:     We discussed that there are a number of resources and interventions to assist with smoking cessation if needed in the future.   On a scale of zero to ten, the patient rates their motivation to stay quit at a 10 out of 10 today.  The patient is confident that they will never smoke in the future.    Recommendations for continued lung cancer screening:      We discussed the NCCN guidelines for lung cancer screening and the patient verbalized understanding that annual screening is recommended until fifteen years  beyond smoking as long as they have no other disease or comorbidity that would prevent them from receiving cancer treatments such as surgery should a lung cancer be detected.  After review of the NCCN guidelines and recommendations for ongoing screening, the patient verbalized understanding of recommendations for follow-up.  The patient has decided to proceed with a Low Dose Lung Cancer Screening CT today.       8 minutes face-to-face spent counseling patient on the continued health benefits of having quit tobacco, maintaining smoking abstinence, smoking cessation strategies and resources, as well as the importance of adherence to annual lung cancer low-dose CT screening.     Electronically signed by Sam Haddad MD, 11/18/24, 3:40 PM EST.

## 2025-04-23 ENCOUNTER — HOSPITAL ENCOUNTER (OUTPATIENT)
Dept: CT IMAGING | Facility: HOSPITAL | Age: 70
Discharge: HOME | End: 2025-04-23
Admitting: INTERNAL MEDICINE
Payer: MEDICARE

## 2025-04-23 DIAGNOSIS — Z87.891 HISTORY OF NICOTINE DEPENDENCE: ICD-10-CM

## 2025-04-23 DIAGNOSIS — Z87.891 PERSONAL HISTORY OF TOBACCO USE, PRESENTING HAZARDS TO HEALTH: ICD-10-CM

## 2025-04-23 PROCEDURE — 71271 CT THORAX LUNG CANCER SCR C-: CPT

## 2025-06-26 ENCOUNTER — OFFICE VISIT (OUTPATIENT)
Dept: PULMONOLOGY | Facility: CLINIC | Age: 70
End: 2025-06-26
Payer: MEDICARE

## 2025-06-26 VITALS
HEIGHT: 65 IN | HEART RATE: 59 BPM | WEIGHT: 113.2 LBS | OXYGEN SATURATION: 97 % | BODY MASS INDEX: 18.86 KG/M2 | TEMPERATURE: 98 F | SYSTOLIC BLOOD PRESSURE: 111 MMHG | DIASTOLIC BLOOD PRESSURE: 64 MMHG | RESPIRATION RATE: 16 BRPM

## 2025-06-26 DIAGNOSIS — K21.9 GASTROESOPHAGEAL REFLUX DISEASE, UNSPECIFIED WHETHER ESOPHAGITIS PRESENT: ICD-10-CM

## 2025-06-26 DIAGNOSIS — R06.00 DYSPNEA, UNSPECIFIED TYPE: ICD-10-CM

## 2025-06-26 DIAGNOSIS — J47.9 BRONCHIECTASIS WITHOUT COMPLICATION: ICD-10-CM

## 2025-06-26 DIAGNOSIS — R06.89 AIRWAY CLEARANCE IMPAIRMENT: ICD-10-CM

## 2025-06-26 DIAGNOSIS — J30.2 SEASONAL ALLERGIES: ICD-10-CM

## 2025-06-26 DIAGNOSIS — R05.9 COUGH, UNSPECIFIED TYPE: ICD-10-CM

## 2025-06-26 DIAGNOSIS — R76.8 LOW SERUM IGG2 SUBCLASS LEVEL: ICD-10-CM

## 2025-06-26 DIAGNOSIS — F17.211 CIGARETTE NICOTINE DEPENDENCE IN REMISSION: Primary | ICD-10-CM

## 2025-06-26 DIAGNOSIS — F17.201 TOBACCO ABUSE, IN REMISSION: ICD-10-CM

## 2025-06-26 DIAGNOSIS — T17.500A MUCUS PLUGGING OF BRONCHI: ICD-10-CM

## 2025-06-26 DIAGNOSIS — J43.2 CENTRILOBULAR EMPHYSEMA: ICD-10-CM

## 2025-06-26 DIAGNOSIS — G47.33 OSA (OBSTRUCTIVE SLEEP APNEA): ICD-10-CM

## 2025-06-26 DIAGNOSIS — J44.9 CHRONIC OBSTRUCTIVE PULMONARY DISEASE, UNSPECIFIED COPD TYPE: ICD-10-CM

## 2025-06-26 RX ORDER — UMECLIDINIUM 62.5 UG/1
AEROSOL, POWDER ORAL
COMMUNITY
Start: 2025-03-12 | End: 2025-06-26 | Stop reason: SDUPTHER

## 2025-06-26 RX ORDER — ALBUTEROL SULFATE 0.83 MG/ML
2.5 SOLUTION RESPIRATORY (INHALATION) EVERY 4 HOURS PRN
Qty: 1620 ML | Refills: 1 | Status: SHIPPED | OUTPATIENT
Start: 2025-06-26 | End: 2025-06-27 | Stop reason: SDUPTHER

## 2025-06-26 RX ORDER — OXYBUTYNIN CHLORIDE 5 MG/1
5 TABLET ORAL DAILY
COMMUNITY
Start: 2025-03-06 | End: 2026-03-06

## 2025-06-26 RX ORDER — FLUTICASONE FUROATE AND VILANTEROL TRIFENATATE 100; 25 UG/1; UG/1
1 POWDER RESPIRATORY (INHALATION)
Qty: 3 EACH | Refills: 1 | Status: SHIPPED | OUTPATIENT
Start: 2025-06-26 | End: 2025-06-27 | Stop reason: SDUPTHER

## 2025-06-26 RX ORDER — ALBUTEROL SULFATE 90 UG/1
2 INHALANT RESPIRATORY (INHALATION) EVERY 4 HOURS PRN
Qty: 54 G | Refills: 1 | Status: SHIPPED | OUTPATIENT
Start: 2025-06-26 | End: 2025-06-27 | Stop reason: SDUPTHER

## 2025-06-26 RX ORDER — UMECLIDINIUM 62.5 UG/1
1 AEROSOL, POWDER ORAL
Qty: 3 EACH | Refills: 1 | Status: SHIPPED | OUTPATIENT
Start: 2025-06-26 | End: 2025-06-27 | Stop reason: SDUPTHER

## 2025-06-26 NOTE — PROGRESS NOTES
Primary Care Provider  Dallin Maxwell APRN     Referring Provider  No ref. provider found     Chief Complaint  Seasonal allergies and Follow-up (6 month. )    Subjective          History of Presenting Illness  Patient is a 69-year-old female, patient of Dr. Senior who presents for management of COPD who presents for a follow-up visit today.  Since last office visit patient had a low-dose chest CT scan completed on 4/23/2025.  Report states emphysema.  Stable 3 mm right lower lobe pulmonary nodule.  Stable areas of subpleural parenchymal scarring throughout both lungs.  Large hiatal hernia.    Patient states that since last office visit her breathing is at baseline. Patient states that she does get short of breath that is worse with exertion, mild in severity, and improved with rest. Patient states that she is taking Breo and Incruse and use albuterol inhaler and albuterol nebulizer treatments as needed. Patient is on Prilosec for reflux. Patient states that she does have a flutter valve with sodium chloride nebulizer treatments used to assist with airway clearance. Patient states that she is taking Flonase for seasonal allergies. Patient states that she does have obstructive sleep apnea and used to see a sleep specialist, however has not used CPAP machine in several years and does not want a CPAP machine at this time. Patient denies any morning headaches or excessive daytime sleepiness.     Patient denies fever, chills, night sweats, swollen glands in the head and neck, unintentional weight loss, hemoptysis, purulent sputum production, dysphagia, chest pain, palpitations, chest tightness, abdominal pain, nausea, vomiting, and diarrhea. Patient denies any leg swelling, orthopnea, paroxysmal nocturnal dyspnea.  Patient is able to perform activities of daily living.        Review of Systems     Family History   Problem Relation Age of Onset    Cancer Mother     Cancer Brother     COPD Brother     Zehra  Hyperthermia Neg Hx         Social History     Socioeconomic History    Marital status:    Tobacco Use    Smoking status: Former     Current packs/day: 0.00     Average packs/day: 1 pack/day for 50.6 years (50.6 ttl pk-yrs)     Types: Cigarettes     Start date:      Quit date: 2023     Years since quittin.8     Passive exposure: Past    Smokeless tobacco: Never   Vaping Use    Vaping status: Never Used   Substance and Sexual Activity    Alcohol use: Never    Drug use: Never    Sexual activity: Defer        Past Medical History:   Diagnosis Date    Allergies     Asthma     COPD (chronic obstructive pulmonary disease)     GERD (gastroesophageal reflux disease)     History of pneumonia     Hyperlipidemia     Sleep apnea         Immunization History   Administered Date(s) Administered    31-influenza Vac Quardvalent Preservativ 2017    Arexvy (RSV, Adults 60+ yrs) 2024    COVID-19 (PFIZER) Purple Cap Monovalent 2021, 2021, 2021    FLUAD TRI 65YR+ 2024    Flu Vaccine Intradermal Quad 18-64YR 10/05/2015, 2016    Flu Vaccine Quad PF 6-35MO 2017    Fluad Quad 65+ 10/18/2021    Fluzone (or Fluarix & Flulaval for VFC) >6mos 10/05/2015, 09/15/2020    Fluzone High-Dose 65+YRS 2013, 2014    Fluzone High-Dose 65+yrs 2013, 2014, 09/15/2020, 10/01/2021, 2022, 2023    Hep A, 2 Dose 2018, 2018    Hepatitis A 2018, 2018    Influenza MDCK Quadrivalent with Preserative 10/15/2018    Influenza, Unspecified 10/01/2019    Pneumococcal Conjugate 13-Valent (PCV13) 10/29/2015    Pneumococcal Conjugate 20-Valent (PCV20) 2023    Pneumococcal Polysaccharide (PPSV23) 2017    Shingrix 2022, 2022    Td (TDVAX) 10/13/2004    Tdap 10/13/2004, 2019       No Known Allergies       Current Outpatient Medications:     acetaminophen-codeine (TYLENOL #3) 300-30 MG per tablet, Take 1 tablet by mouth  Every 4 (Four) Hours As Needed for Moderate Pain., Disp: , Rfl:     acetic acid (VOSOL) 2 % otic solution, 4 drops., Disp: , Rfl:     acetic acid-hydrocortisone (VOSOL-HC) 1-2 % otic solution, 2 (Two) Times a Day., Disp: , Rfl:     albuterol (PROVENTIL) (2.5 MG/3ML) 0.083% nebulizer solution, Take 2.5 mg by nebulization Every 4 (Four) Hours As Needed for Wheezing for up to 90 days., Disp: 1620 mL, Rfl: 1    albuterol sulfate  (90 Base) MCG/ACT inhaler, Inhale 2 puffs Every 4 (Four) Hours As Needed for Wheezing for up to 90 days., Disp: 54 g, Rfl: 1    alendronate (FOSAMAX) 70 MG tablet, Take 1 tablet by mouth., Disp: , Rfl:     atorvastatin (LIPITOR) 20 MG tablet, Take 1 tablet by mouth Daily., Disp: , Rfl:     baclofen (LIORESAL) 10 MG tablet, , Disp: , Rfl:     Breo Ellipta 100-25 MCG/ACT aerosol powder , Inhale 1 puff Daily for 90 days. Rinse mouth out after each use, Disp: 3 each, Rfl: 1    diclofenac (VOLTAREN) 75 MG EC tablet, Take 1 tablet by mouth Daily., Disp: , Rfl:     fluticasone (FLONASE) 50 MCG/ACT nasal spray, Administer 1 spray into the nostril(s) as directed by provider Daily., Disp: , Rfl:     hydrOXYzine (ATARAX) 25 MG tablet, Take 1 tablet by mouth Every Night., Disp: , Rfl:     Incruse Ellipta 62.5 MCG/ACT aerosol powder , Inhale 1 puff Daily for 90 days., Disp: 3 each, Rfl: 1    omeprazole (priLOSEC) 20 MG capsule, Take 1 capsule by mouth Daily., Disp: , Rfl:     oxybutynin (DITROPAN) 5 MG tablet, Take 1 tablet by mouth Daily., Disp: , Rfl:     potassium chloride (K-DUR,KLOR-CON) 20 MEQ CR tablet, Take 1 tablet by mouth Daily., Disp: , Rfl:     potassium chloride ER (K-TAB) 20 MEQ tablet controlled-release ER tablet, Take 1 tablet by mouth Daily., Disp: , Rfl:     triamcinolone (KENALOG) 0.1 % cream, , Disp: , Rfl:     vitamin D (ERGOCALCIFEROL) 1.25 MG (39328 UT) capsule capsule, , Disp: , Rfl:      Objective     Physical Exam  Vital Signs:   WDWN, Alert, NAD.    HEENT:  PERRL, EOMI.   "OP, nares clear, no sinus tenderness  Neck:  Supple, no JVD, no thyromegaly.  Lymph: no axillary, cervical, supraclavicular lymphadenopathy noted bilaterally  Chest: diminished breath sounds bilaterally. No wheezes, rales, or rhonchi appreciated. Normal work of breathing noted. Patient is able speak full sentences without difficulty.   CV: RRR, no MGR, pulses 2+, equal.  Abd:  Soft, NT, ND, + BS, no HSM  EXT:  no clubbing, no cyanosis, no edema, no joint tenderness  Neuro:  A&Ox3, CN grossly intact, no focal deficits.  Skin: No rashes or lesions noted.    /64 (BP Location: Right arm, Patient Position: Sitting, Cuff Size: Small Adult)   Pulse 59   Temp 98 °F (36.7 °C) (Oral)   Resp 16   Ht 165.1 cm (65\")   Wt 51.3 kg (113 lb 3.2 oz)   SpO2 97%   BMI 18.84 kg/m²         Result Review :   I have reviewed Dr. Haddad's last office visit note.  I also reviewed low-dose chest CT scan report dated from 4/23/2025.  See scanned report.    Procedures:          CT Chest Low Dose Cancer Screening WO  Result Date: 4/25/2025  Impression: 1. Emphysema. 2. Stable 3 mm right lower lobe pulmonary nodule. 3. Stable areas of subpleural parenchymal scarring throughout both lungs. 4. Large hiatal hernia Recommendation: Continue annual screening with LDCT Lung Rads Assessment: Lung-RADS L2 - Benign appearance or <1% chance of malignancy. Electronically Signed: Zackery Roy MD  4/25/2025 12:18 PM EDT  Workstation ID: [id number]          Assessment and Plan      Assessment:  1. COPD.  Gold stage C.  Alpha-1 antitrypsin with a normal genotype of MM with a level of 165.9.  2. Bronchiectasis without exacerbation  3. Chronic dyspnea.  4. Seasonal allergies.    5. Mucus plugging.  6. Airway clearance impairment.   7. Low IgG subclass 2.  Just below the lower limit of normal and very mildly reduced.    8. GERD.  Patient is on a PPI.  9. Obstructive sleep apnea. Declines CPAP machine.   10. Hiatal hernia.   11. Tobacco abuse of " cigarettes in remission.            Plan:  1.  Continue Breo and Incruse as prescribed and rinse mouth out after each use.  2.  Continue albuterol inhaler as needed.  3.  Continue flutter valve with sodium chloride nebulizer treatments to assist with airway clearance.  4.  Continue Flonase nasal spray.   5.  For GERD,  patient to continue PPI.   Patient is also advised to sleep with the head of the bed elevated and do not eat 3-4 hours prior to bedtime.  6. Patient reports that she was under the care of a sleep specialist and used to be on a CPAP machine however could not tolerate it and turned in several years ago. Did offer to send patient for another sleep study for evaluation, however patient declines at this time. Discussed with the patient about the signs and symptoms of sleep apnea. In addition, I have also discussed pathophysiology of sleep apnea. I also discussed the complications of untreated sleep apnea including effects on hypertension, diabetes mellitus and nonrestorative sleep with hypersomnia which can increase risk for motor vehicle accidents. Untreated sleep apnea is also a risk factor for development of pulmonary hypertension, atrial fibrillation, and stroke. Discussed in detail of various testing methods including home-based and lab based sleep studies. Patient verbalized understanding.   7. Patient with a large hiatal hernia. Pt declines referral to thoracic surgery at this time.   8.  Vaccination status: patient reports they are up-to-date with RSV, flu, pneumonia, and did receive 3 Covid vaccines.    9.  Smoking status: Patient is a former cigarette smoker.  10.  Patient will be due for a low dose chest CT scan in April 2026. Order placed today.   11.  Patient to call the office, 911, or go to the ER with new or worsening symptoms.  12. Follow-up in 6 months, sooner if needed.               Follow Up   Return in about 6 months (around 12/26/2025) for in [place].  Patient was given  instructions and counseling regarding her condition or for health maintenance advice. Please see specific information pulled into the AVS if appropriate.

## 2025-06-27 DIAGNOSIS — J30.2 SEASONAL ALLERGIES: ICD-10-CM

## 2025-06-27 DIAGNOSIS — J47.9 BRONCHIECTASIS WITHOUT COMPLICATION: ICD-10-CM

## 2025-06-27 DIAGNOSIS — R06.00 DYSPNEA, UNSPECIFIED TYPE: ICD-10-CM

## 2025-06-27 DIAGNOSIS — R05.9 COUGH, UNSPECIFIED TYPE: ICD-10-CM

## 2025-06-27 DIAGNOSIS — J44.9 CHRONIC OBSTRUCTIVE PULMONARY DISEASE, UNSPECIFIED COPD TYPE: ICD-10-CM

## 2025-06-27 DIAGNOSIS — J43.2 CENTRILOBULAR EMPHYSEMA: ICD-10-CM

## 2025-06-27 DIAGNOSIS — R06.89 AIRWAY CLEARANCE IMPAIRMENT: ICD-10-CM

## 2025-06-27 RX ORDER — UMECLIDINIUM 62.5 UG/1
1 AEROSOL, POWDER ORAL
Qty: 3 EACH | Refills: 1 | Status: SHIPPED | OUTPATIENT
Start: 2025-06-27 | End: 2025-09-25

## 2025-06-27 RX ORDER — FLUTICASONE FUROATE AND VILANTEROL 100; 25 UG/1; UG/1
1 POWDER RESPIRATORY (INHALATION)
Qty: 3 EACH | Refills: 1 | Status: SHIPPED | OUTPATIENT
Start: 2025-06-27 | End: 2025-09-25

## 2025-06-27 RX ORDER — ALBUTEROL SULFATE 90 UG/1
2 INHALANT RESPIRATORY (INHALATION) EVERY 4 HOURS PRN
Qty: 54 G | Refills: 3 | Status: SHIPPED | OUTPATIENT
Start: 2025-06-27 | End: 2025-09-25

## 2025-06-27 RX ORDER — ALBUTEROL SULFATE 0.83 MG/ML
2.5 SOLUTION RESPIRATORY (INHALATION) EVERY 4 HOURS PRN
Qty: 1620 ML | Refills: 3 | Status: SHIPPED | OUTPATIENT
Start: 2025-06-27 | End: 2026-06-27

## 2025-07-09 RX ORDER — BUDESONIDE, GLYCOPYRROLATE, AND FORMOTEROL FUMARATE 160; 9; 4.8 UG/1; UG/1; UG/1
2 AEROSOL, METERED RESPIRATORY (INHALATION) 2 TIMES DAILY
Qty: 1 EACH | Refills: 3 | Status: SHIPPED | OUTPATIENT
Start: 2025-07-09 | End: 2025-07-10

## 2025-07-09 NOTE — TELEPHONE ENCOUNTER
Patient currently taking Breo and Incruse.  Breo on patients formulary list.  Incruse non formulary.     Patient states that she has tried and failed Spiriva respimat and Spriva Handihaler.    Ok to switch inhaler to Breztri.  Will pend medication to [facility].   Patient aware of inhaler changes due to insurance.

## (undated) DEVICE — SYR LUER SLPTP 50ML

## (undated) DEVICE — CUP SPECI 4OZ LF STRL

## (undated) DEVICE — SINGLE USE BIOPSY VALVE MAJ-210: Brand: SINGLE USE BIOPSY VALVE (STERILE)

## (undated) DEVICE — LINER SURG CANSTR SXN S/RIGD 1500CC

## (undated) DEVICE — BLCK/BITE BLOX WO/DENTL/RIM W/STRAP 54F

## (undated) DEVICE — SOLIDIFIER LIQLOC PLS 1500CC BT

## (undated) DEVICE — Device

## (undated) DEVICE — TRAP,MUCUS SPECIMEN,40CC: Brand: MEDLINE

## (undated) DEVICE — CONN JET HYDRA H20 AUXILIARY DISP

## (undated) DEVICE — DEV ATOMIZATION MUCOSAL/NASALTRACH

## (undated) DEVICE — SINGLE USE SUCTION VALVE MAJ-209: Brand: SINGLE USE SUCTION VALVE (STERILE)